# Patient Record
Sex: FEMALE | Race: WHITE | Employment: FULL TIME | URBAN - METROPOLITAN AREA
[De-identification: names, ages, dates, MRNs, and addresses within clinical notes are randomized per-mention and may not be internally consistent; named-entity substitution may affect disease eponyms.]

---

## 2017-01-03 RX ORDER — LISINOPRIL 10 MG/1
TABLET ORAL
Qty: 30 TABLET | Refills: 3 | Status: SHIPPED | OUTPATIENT
Start: 2017-01-03 | End: 2017-05-07 | Stop reason: SDUPTHER

## 2017-01-03 RX ORDER — ATORVASTATIN CALCIUM 20 MG/1
TABLET, FILM COATED ORAL
Qty: 30 TABLET | Refills: 3 | Status: SHIPPED | OUTPATIENT
Start: 2017-01-03 | End: 2017-05-07 | Stop reason: SDUPTHER

## 2017-01-20 DIAGNOSIS — E10.69 TYPE 1 DIABETES MELLITUS WITH HYPEROSMOLAR COMA (HCC): ICD-10-CM

## 2017-01-20 DIAGNOSIS — E10.65 TYPE 1 DIABETES MELLITUS WITH HYPEROSMOLAR COMA (HCC): ICD-10-CM

## 2017-01-20 DIAGNOSIS — E87.0 TYPE 1 DIABETES MELLITUS WITH HYPEROSMOLAR COMA (HCC): ICD-10-CM

## 2017-01-24 ENCOUNTER — OFFICE VISIT (OUTPATIENT)
Dept: SURGERY | Age: 27
End: 2017-01-24

## 2017-01-24 VITALS
HEART RATE: 92 BPM | HEIGHT: 68 IN | DIASTOLIC BLOOD PRESSURE: 58 MMHG | SYSTOLIC BLOOD PRESSURE: 98 MMHG | WEIGHT: 223 LBS | BODY MASS INDEX: 33.8 KG/M2

## 2017-01-24 DIAGNOSIS — E78.00 HYPERCHOLESTEROLEMIA: ICD-10-CM

## 2017-01-24 DIAGNOSIS — E03.9 HYPOTHYROIDISM, UNSPECIFIED TYPE: ICD-10-CM

## 2017-01-24 LAB — GLUCOSE BLD-MCNC: 167 MG/DL

## 2017-01-24 PROCEDURE — 99213 OFFICE O/P EST LOW 20 MIN: CPT | Performed by: INTERNAL MEDICINE

## 2017-01-24 PROCEDURE — 82962 GLUCOSE BLOOD TEST: CPT | Performed by: INTERNAL MEDICINE

## 2017-02-16 RX ORDER — LEVOTHYROXINE SODIUM 0.05 MG/1
TABLET ORAL
Qty: 30 TABLET | Refills: 3 | Status: SHIPPED | OUTPATIENT
Start: 2017-02-16 | End: 2017-06-20 | Stop reason: SDUPTHER

## 2017-03-31 RX ORDER — FUROSEMIDE 40 MG/1
TABLET ORAL
Qty: 30 TABLET | Refills: 3 | Status: SHIPPED | OUTPATIENT
Start: 2017-03-31 | End: 2017-08-02 | Stop reason: SDUPTHER

## 2017-04-17 RX ORDER — POTASSIUM CHLORIDE 750 MG/1
TABLET, EXTENDED RELEASE ORAL
Qty: 30 TABLET | Refills: 3 | Status: SHIPPED | OUTPATIENT
Start: 2017-04-17 | End: 2017-08-23 | Stop reason: SDUPTHER

## 2017-05-08 RX ORDER — ATORVASTATIN CALCIUM 20 MG/1
TABLET, FILM COATED ORAL
Qty: 30 TABLET | Refills: 3 | Status: SHIPPED | OUTPATIENT
Start: 2017-05-08 | End: 2017-09-26 | Stop reason: SDUPTHER

## 2017-05-08 RX ORDER — LISINOPRIL 10 MG/1
TABLET ORAL
Qty: 30 TABLET | Refills: 3 | Status: SHIPPED | OUTPATIENT
Start: 2017-05-08 | End: 2017-09-26 | Stop reason: SDUPTHER

## 2017-06-20 RX ORDER — LEVOTHYROXINE SODIUM 0.05 MG/1
TABLET ORAL
Qty: 30 TABLET | Refills: 3 | Status: SHIPPED | OUTPATIENT
Start: 2017-06-20 | End: 2017-10-31 | Stop reason: SDUPTHER

## 2017-06-28 ENCOUNTER — OFFICE VISIT (OUTPATIENT)
Dept: SURGERY | Age: 27
End: 2017-06-28

## 2017-06-28 VITALS
DIASTOLIC BLOOD PRESSURE: 68 MMHG | WEIGHT: 206 LBS | HEIGHT: 68 IN | BODY MASS INDEX: 31.22 KG/M2 | HEART RATE: 92 BPM | SYSTOLIC BLOOD PRESSURE: 102 MMHG

## 2017-06-28 LAB
GLUCOSE BLD-MCNC: 109 MG/DL
HBA1C MFR BLD: 5.6 %

## 2017-06-28 PROCEDURE — 82962 GLUCOSE BLOOD TEST: CPT | Performed by: INTERNAL MEDICINE

## 2017-06-28 PROCEDURE — 99212 OFFICE O/P EST SF 10 MIN: CPT | Performed by: INTERNAL MEDICINE

## 2017-06-28 PROCEDURE — 83036 HEMOGLOBIN GLYCOSYLATED A1C: CPT | Performed by: INTERNAL MEDICINE

## 2017-08-02 RX ORDER — FUROSEMIDE 40 MG/1
TABLET ORAL
Qty: 30 TABLET | Refills: 3 | Status: SHIPPED | OUTPATIENT
Start: 2017-08-02 | End: 2017-12-26 | Stop reason: SDUPTHER

## 2017-08-23 RX ORDER — POTASSIUM CHLORIDE 750 MG/1
TABLET, EXTENDED RELEASE ORAL
Qty: 30 TABLET | Refills: 3 | Status: SHIPPED | OUTPATIENT
Start: 2017-08-23 | End: 2018-01-30 | Stop reason: SDUPTHER

## 2017-09-27 RX ORDER — ATORVASTATIN CALCIUM 20 MG/1
TABLET, FILM COATED ORAL
Qty: 30 TABLET | Refills: 1 | Status: SHIPPED | OUTPATIENT
Start: 2017-09-27 | End: 2017-12-11 | Stop reason: SDUPTHER

## 2017-09-27 RX ORDER — LISINOPRIL 10 MG/1
TABLET ORAL
Qty: 30 TABLET | Refills: 3 | Status: SHIPPED | OUTPATIENT
Start: 2017-09-27 | End: 2018-02-19 | Stop reason: SDUPTHER

## 2017-12-27 RX ORDER — FUROSEMIDE 40 MG/1
TABLET ORAL
Qty: 30 TABLET | Refills: 0 | Status: SHIPPED | OUTPATIENT
Start: 2017-12-27 | End: 2018-01-30 | Stop reason: SDUPTHER

## 2018-01-31 RX ORDER — POTASSIUM CHLORIDE 750 MG/1
TABLET, EXTENDED RELEASE ORAL
Qty: 30 TABLET | Refills: 3 | Status: SHIPPED | OUTPATIENT
Start: 2018-01-31 | End: 2018-06-06 | Stop reason: SDUPTHER

## 2018-01-31 RX ORDER — FUROSEMIDE 40 MG/1
TABLET ORAL
Qty: 30 TABLET | Refills: 3 | Status: SHIPPED | OUTPATIENT
Start: 2018-01-31 | End: 2018-04-30 | Stop reason: SDUPTHER

## 2018-02-19 RX ORDER — LISINOPRIL 10 MG/1
TABLET ORAL
Qty: 30 TABLET | Refills: 3 | Status: SHIPPED | OUTPATIENT
Start: 2018-02-19 | End: 2018-04-30 | Stop reason: SDUPTHER

## 2018-04-05 ENCOUNTER — OFFICE VISIT (OUTPATIENT)
Dept: ENDOCRINOLOGY | Age: 28
End: 2018-04-05

## 2018-04-05 VITALS
WEIGHT: 185 LBS | HEART RATE: 78 BPM | HEIGHT: 68 IN | SYSTOLIC BLOOD PRESSURE: 104 MMHG | DIASTOLIC BLOOD PRESSURE: 70 MMHG | BODY MASS INDEX: 28.04 KG/M2

## 2018-04-05 DIAGNOSIS — E03.9 HYPOTHYROIDISM, UNSPECIFIED TYPE: ICD-10-CM

## 2018-04-05 LAB
GLUCOSE BLD-MCNC: 82 MG/DL
HBA1C MFR BLD: 5.5 %

## 2018-04-05 PROCEDURE — 83036 HEMOGLOBIN GLYCOSYLATED A1C: CPT | Performed by: INTERNAL MEDICINE

## 2018-04-05 PROCEDURE — 82962 GLUCOSE BLOOD TEST: CPT | Performed by: INTERNAL MEDICINE

## 2018-04-05 PROCEDURE — 99212 OFFICE O/P EST SF 10 MIN: CPT | Performed by: INTERNAL MEDICINE

## 2018-04-06 RX ORDER — LEVOTHYROXINE SODIUM 0.05 MG/1
50 TABLET ORAL DAILY
Qty: 30 TABLET | Refills: 3 | Status: SHIPPED | OUTPATIENT
Start: 2018-04-06 | End: 2018-05-08 | Stop reason: SDUPTHER

## 2018-04-30 RX ORDER — LISINOPRIL 10 MG/1
10 TABLET ORAL DAILY
Qty: 30 TABLET | Refills: 3 | Status: SHIPPED | OUTPATIENT
Start: 2018-04-30 | End: 2018-05-08 | Stop reason: SDUPTHER

## 2018-04-30 RX ORDER — FUROSEMIDE 40 MG/1
40 TABLET ORAL DAILY
Qty: 30 TABLET | Refills: 3 | Status: SHIPPED | OUTPATIENT
Start: 2018-04-30 | End: 2018-07-19 | Stop reason: SDUPTHER

## 2018-04-30 RX ORDER — ATORVASTATIN CALCIUM 20 MG/1
20 TABLET, FILM COATED ORAL DAILY
Qty: 30 TABLET | Refills: 3 | Status: SHIPPED | OUTPATIENT
Start: 2018-04-30 | End: 2019-04-14 | Stop reason: SDUPTHER

## 2018-05-08 RX ORDER — LEVOTHYROXINE SODIUM 0.05 MG/1
50 TABLET ORAL DAILY
Qty: 30 TABLET | Refills: 3 | Status: SHIPPED | OUTPATIENT
Start: 2018-05-08 | End: 2019-08-07 | Stop reason: SDUPTHER

## 2018-05-08 RX ORDER — LISINOPRIL 10 MG/1
10 TABLET ORAL DAILY
Qty: 30 TABLET | Refills: 3 | Status: SHIPPED | OUTPATIENT
Start: 2018-05-08 | End: 2019-08-07 | Stop reason: SDUPTHER

## 2018-06-06 RX ORDER — POTASSIUM CHLORIDE 750 MG/1
TABLET, EXTENDED RELEASE ORAL
Qty: 30 TABLET | Refills: 3 | Status: SHIPPED | OUTPATIENT
Start: 2018-06-06 | End: 2018-10-15 | Stop reason: SDUPTHER

## 2018-07-09 DIAGNOSIS — E03.9 HYPOTHYROIDISM, UNSPECIFIED TYPE: ICD-10-CM

## 2018-07-09 LAB
ANION GAP SERPL CALCULATED.3IONS-SCNC: 7 MEQ/L (ref 7–13)
BUN BLDV-MCNC: 43 MG/DL (ref 6–20)
CALCIUM SERPL-MCNC: 8 MG/DL (ref 8.6–10.2)
CHLORIDE BLD-SCNC: 107 MEQ/L (ref 98–107)
CO2: 24 MEQ/L (ref 22–29)
CREAT SERPL-MCNC: 0.68 MG/DL (ref 0.5–0.9)
CREATININE URINE: 13.6 MG/DL
GFR AFRICAN AMERICAN: >60
GFR NON-AFRICAN AMERICAN: >60
GLUCOSE BLD-MCNC: 96 MG/DL (ref 74–109)
HBA1C MFR BLD: 5.7 % (ref 4.8–5.9)
MICROALBUMIN UR-MCNC: 14.2 MG/DL
MICROALBUMIN/CREAT UR-RTO: 1044.1 MG/G (ref 0–30)
POTASSIUM SERPL-SCNC: 4.1 MEQ/L (ref 3.5–5.1)
SODIUM BLD-SCNC: 138 MEQ/L (ref 132–144)
T4 FREE: 1.32 NG/DL (ref 0.93–1.7)
TSH SERPL DL<=0.05 MIU/L-ACNC: 1.41 UIU/ML (ref 0.27–4.2)

## 2018-07-12 ENCOUNTER — OFFICE VISIT (OUTPATIENT)
Dept: ENDOCRINOLOGY | Age: 28
End: 2018-07-12
Payer: COMMERCIAL

## 2018-07-12 VITALS
DIASTOLIC BLOOD PRESSURE: 64 MMHG | WEIGHT: 188 LBS | HEART RATE: 80 BPM | HEIGHT: 68 IN | BODY MASS INDEX: 28.49 KG/M2 | SYSTOLIC BLOOD PRESSURE: 102 MMHG

## 2018-07-12 DIAGNOSIS — R60.0 LOCALIZED EDEMA: ICD-10-CM

## 2018-07-12 DIAGNOSIS — E10.21 DIABETIC NEPHROPATHY ASSOCIATED WITH TYPE 1 DIABETES MELLITUS (HCC): ICD-10-CM

## 2018-07-12 DIAGNOSIS — E03.9 HYPOTHYROIDISM, UNSPECIFIED TYPE: ICD-10-CM

## 2018-07-12 LAB — GLUCOSE BLD-MCNC: 112 MG/DL

## 2018-07-12 PROCEDURE — 99214 OFFICE O/P EST MOD 30 MIN: CPT | Performed by: INTERNAL MEDICINE

## 2018-07-12 PROCEDURE — 82962 GLUCOSE BLOOD TEST: CPT | Performed by: INTERNAL MEDICINE

## 2018-07-12 RX ORDER — SPIRONOLACTONE 50 MG/1
50 TABLET, FILM COATED ORAL DAILY
Qty: 30 TABLET | Refills: 3 | Status: SHIPPED | OUTPATIENT
Start: 2018-07-12 | End: 2019-03-07 | Stop reason: SDUPTHER

## 2018-07-16 ASSESSMENT — ENCOUNTER SYMPTOMS: SHORTNESS OF BREATH: 0

## 2018-07-17 NOTE — PROGRESS NOTES
Patient Fasting?/# of Hours     Answer:   Yes, 12 Hours    T4, Free     Standing Status:   Future     Standing Expiration Date:   7/12/2019    TSH without Reflex     Standing Status:   Future     Standing Expiration Date:   7/12/2019    POCT Glucose    HM DIABETES FOOT EXAM     Add aldactone for 1-2 weeks   Orders Placed This Encounter   Medications    spironolactone (ALDACTONE) 50 MG tablet     Sig: Take 1 tablet by mouth daily     Dispense:  30 tablet     Refill:  3     Diabetes education provided today:    Diabetic nephropathy: Kidney function, microalbumin as a sign of diabetic nephropathy. Stages of kidney disease. Managing high and low sugar readings.       continue lasix   continue current medication regimen for diabetes hypothyroidism   More than 50 % of 25 min spend in pt education/counsleing review of labs

## 2018-07-19 RX ORDER — FUROSEMIDE 40 MG/1
40 TABLET ORAL 2 TIMES DAILY
Qty: 60 TABLET | Refills: 3 | Status: SHIPPED | OUTPATIENT
Start: 2018-07-19 | End: 2019-01-06 | Stop reason: SDUPTHER

## 2018-10-15 RX ORDER — POTASSIUM CHLORIDE 750 MG/1
TABLET, EXTENDED RELEASE ORAL
Qty: 30 TABLET | Refills: 2 | Status: SHIPPED | OUTPATIENT
Start: 2018-10-15 | End: 2020-09-18 | Stop reason: SDUPTHER

## 2019-01-07 RX ORDER — FUROSEMIDE 40 MG/1
TABLET ORAL
Qty: 30 TABLET | Refills: 3 | Status: SHIPPED | OUTPATIENT
Start: 2019-01-07 | End: 2019-04-14 | Stop reason: SDUPTHER

## 2019-01-07 RX ORDER — POTASSIUM CHLORIDE 750 MG/1
TABLET, FILM COATED, EXTENDED RELEASE ORAL
Qty: 30 TABLET | Refills: 3 | Status: SHIPPED | OUTPATIENT
Start: 2019-01-07 | End: 2019-04-29 | Stop reason: SDUPTHER

## 2019-03-08 RX ORDER — SPIRONOLACTONE 50 MG/1
TABLET, FILM COATED ORAL
Qty: 90 TABLET | Refills: 0 | Status: SHIPPED | OUTPATIENT
Start: 2019-03-08 | End: 2019-06-06 | Stop reason: SDUPTHER

## 2019-04-15 RX ORDER — FUROSEMIDE 40 MG/1
40 TABLET ORAL DAILY
Qty: 30 TABLET | Refills: 3 | Status: SHIPPED | OUTPATIENT
Start: 2019-04-15 | End: 2019-06-12 | Stop reason: SDUPTHER

## 2019-04-15 RX ORDER — ATORVASTATIN CALCIUM 20 MG/1
20 TABLET, FILM COATED ORAL DAILY
Qty: 30 TABLET | Refills: 3 | Status: SHIPPED | OUTPATIENT
Start: 2019-04-15 | End: 2019-07-11 | Stop reason: SDUPTHER

## 2019-04-15 RX ORDER — FUROSEMIDE 40 MG/1
TABLET ORAL
Qty: 30 TABLET | Refills: 3 | Status: SHIPPED | OUTPATIENT
Start: 2019-04-15 | End: 2019-06-10 | Stop reason: SDUPTHER

## 2019-04-30 RX ORDER — POTASSIUM CHLORIDE 750 MG/1
TABLET, FILM COATED, EXTENDED RELEASE ORAL
Qty: 30 TABLET | Refills: 3 | Status: SHIPPED | OUTPATIENT
Start: 2019-04-30 | End: 2019-06-20 | Stop reason: SDUPTHER

## 2019-06-10 DIAGNOSIS — E11.21 DIABETIC NEPHROPATHY (HCC): ICD-10-CM

## 2019-06-10 DIAGNOSIS — E03.9 HYPOTHYROID: ICD-10-CM

## 2019-06-10 RX ORDER — SPIRONOLACTONE 50 MG/1
TABLET, FILM COATED ORAL
Qty: 90 TABLET | Refills: 0 | Status: SHIPPED | OUTPATIENT
Start: 2019-06-10 | End: 2019-09-03 | Stop reason: SDUPTHER

## 2019-06-11 RX ORDER — FUROSEMIDE 40 MG/1
40 TABLET ORAL DAILY
Qty: 30 TABLET | Refills: 0 | Status: SHIPPED | OUTPATIENT
Start: 2019-06-11 | End: 2019-07-19 | Stop reason: SDUPTHER

## 2019-06-13 RX ORDER — FUROSEMIDE 40 MG/1
40 TABLET ORAL DAILY
Qty: 30 TABLET | Refills: 0 | Status: SHIPPED | OUTPATIENT
Start: 2019-06-13 | End: 2019-06-20 | Stop reason: SDUPTHER

## 2019-06-20 ENCOUNTER — OFFICE VISIT (OUTPATIENT)
Dept: ENDOCRINOLOGY | Age: 29
End: 2019-06-20
Payer: COMMERCIAL

## 2019-06-20 VITALS
DIASTOLIC BLOOD PRESSURE: 60 MMHG | HEART RATE: 89 BPM | BODY MASS INDEX: 29.35 KG/M2 | WEIGHT: 190.2 LBS | SYSTOLIC BLOOD PRESSURE: 94 MMHG

## 2019-06-20 DIAGNOSIS — E10.21 DIABETIC NEPHROPATHY ASSOCIATED WITH TYPE 1 DIABETES MELLITUS (HCC): ICD-10-CM

## 2019-06-20 DIAGNOSIS — E03.9 HYPOTHYROID: ICD-10-CM

## 2019-06-20 DIAGNOSIS — E03.9 HYPOTHYROIDISM, UNSPECIFIED TYPE: ICD-10-CM

## 2019-06-20 DIAGNOSIS — M54.12 CERVICAL RADICULOPATHY: ICD-10-CM

## 2019-06-20 DIAGNOSIS — M54.16 LUMBAR RADICULOPATHY: ICD-10-CM

## 2019-06-20 LAB
ANION GAP SERPL CALCULATED.3IONS-SCNC: 15 MEQ/L (ref 9–15)
BUN BLDV-MCNC: 20 MG/DL (ref 6–20)
CALCIUM SERPL-MCNC: 9.3 MG/DL (ref 8.5–9.9)
CHLORIDE BLD-SCNC: 102 MEQ/L (ref 95–107)
CHP ED QC CHECK: NORMAL
CO2: 22 MEQ/L (ref 20–31)
CREAT SERPL-MCNC: 0.75 MG/DL (ref 0.5–0.9)
CREATININE URINE: 193.3 MG/DL
GFR AFRICAN AMERICAN: >60
GFR NON-AFRICAN AMERICAN: >60
GLUCOSE BLD-MCNC: 116 MG/DL (ref 70–99)
GLUCOSE BLD-MCNC: 131 MG/DL
HBA1C MFR BLD: 5.9 %
MICROALBUMIN UR-MCNC: <1.2 MG/DL
MICROALBUMIN/CREAT UR-RTO: NORMAL MG/G (ref 0–30)
POTASSIUM SERPL-SCNC: 4.7 MEQ/L (ref 3.4–4.9)
SODIUM BLD-SCNC: 139 MEQ/L (ref 135–144)
T4 FREE: 1.39 NG/DL (ref 0.84–1.68)
TSH SERPL DL<=0.05 MIU/L-ACNC: 3.08 UIU/ML (ref 0.44–3.86)

## 2019-06-20 PROCEDURE — 82962 GLUCOSE BLOOD TEST: CPT | Performed by: INTERNAL MEDICINE

## 2019-06-20 PROCEDURE — 83036 HEMOGLOBIN GLYCOSYLATED A1C: CPT | Performed by: INTERNAL MEDICINE

## 2019-06-20 PROCEDURE — 99214 OFFICE O/P EST MOD 30 MIN: CPT | Performed by: INTERNAL MEDICINE

## 2019-06-20 RX ORDER — POTASSIUM CHLORIDE 750 MG/1
TABLET, FILM COATED, EXTENDED RELEASE ORAL
Qty: 30 TABLET | Refills: 3 | Status: SHIPPED | OUTPATIENT
Start: 2019-06-20 | End: 2021-06-10

## 2019-06-20 RX ORDER — FUROSEMIDE 40 MG/1
40 TABLET ORAL DAILY
Qty: 30 TABLET | Refills: 3 | Status: SHIPPED | OUTPATIENT
Start: 2019-06-20 | End: 2020-09-18 | Stop reason: SDUPTHER

## 2019-06-24 ASSESSMENT — ENCOUNTER SYMPTOMS: BACK PAIN: 1

## 2019-06-24 NOTE — PROGRESS NOTES
file   Lifestyle    Physical activity:     Days per week: Not on file     Minutes per session: Not on file    Stress: Not on file   Relationships    Social connections:     Talks on phone: Not on file     Gets together: Not on file     Attends Buddhist service: Not on file     Active member of club or organization: Not on file     Attends meetings of clubs or organizations: Not on file     Relationship status: Not on file    Intimate partner violence:     Fear of current or ex partner: Not on file     Emotionally abused: Not on file     Physically abused: Not on file     Forced sexual activity: Not on file   Other Topics Concern    Not on file   Social History Narrative    Not on file     No past surgical history on file.     Allergies   Allergen Reactions    Morphine     Pcn [Penicillins]        Current Outpatient Medications:     furosemide (LASIX) 40 MG tablet, Take 1 tablet by mouth daily, Disp: 30 tablet, Rfl: 3    potassium chloride (KLOR-CON 10) 10 MEQ extended release tablet, TAKE 1 TABLET BY MOUTH EVERY DAY, Disp: 30 tablet, Rfl: 3    insulin lispro (HUMALOG KWIKPEN) 100 UNIT/ML pen, 10 units at each meals, Disp: 30 mL, Rfl: 0    insulin regular (NOVOLIN R) 100 UNIT/ML injection, 10 units with each meals, Disp: 1 vial, Rfl: 3    blood glucose test strips (FREESTYLE LITE) strip, 1 each by In Vitro route 4 times daily, Disp: 150 each, Rfl: 0    furosemide (LASIX) 40 MG tablet, Take 1 tablet by mouth daily, Disp: 30 tablet, Rfl: 0    spironolactone (ALDACTONE) 50 MG tablet, TAKE 1 TABLET BY MOUTH EVERY DAY, Disp: 90 tablet, Rfl: 0    atorvastatin (LIPITOR) 20 MG tablet, Take 1 tablet by mouth daily, Disp: 30 tablet, Rfl: 3    potassium chloride (KLOR-CON M) 10 MEQ extended release tablet, TAKE 1 TABLET BY MOUTH ONCE DAILY, Disp: 30 tablet, Rfl: 2    levothyroxine (SYNTHROID) 50 MCG tablet, Take 1 tablet by mouth daily, Disp: 30 tablet, Rfl: 3    lisinopril (PRINIVIL;ZESTRIL) 10 MG tablet, Take 1 tablet by mouth daily, Disp: 30 tablet, Rfl: 3    LANTUS SOLOSTAR 100 UNIT/ML injection pen, INJECT 60 UNITS SUBCUTANEOUSLY DAILY AT BEDTIME, Disp: 15 mL, Rfl: 3    [facility] UNIFINE PENTIPS 32G X 4 MM MISC, use FOUR TIMES DAILY, Disp: 300 each, Rfl: 3    Blood Glucose Monitoring Suppl (FREESTYLE LITE) RAMYA, Please give 1 meter dx 250.01, Disp: 1 Device, Rfl: 0    FREESTYLE LANCETS MISC, Pt test 4x daily dx 250.01, Disp: 150 each, Rfl: 6    Alcohol Swabs (ALCOHOL WIPES) PADS, As directed, Disp: 100 each, Rfl: 06    Insulin Pen Needle (NOVOFINE) 32G X 6 MM MISC, Qid, Disp: 300 each, Rfl: 3    Review of Systems   Cardiovascular: Positive for leg swelling. Endocrine: Negative for polydipsia and polyuria. Musculoskeletal: Positive for back pain. All other systems reviewed and are negative. Vitals:    06/20/19 1610   BP: 94/60   Site: Left Upper Arm   Position: Sitting   Cuff Size: Small Adult   Pulse: 89   Weight: 190 lb 3.2 oz (86.3 kg)       Objective:   Physical Exam   Constitutional: She appears well-developed and well-nourished. HENT:   Head: Normocephalic and atraumatic. Right Ear: External ear normal.   Left Ear: External ear normal.   Eyes: Pupils are equal, round, and reactive to light. Right eye exhibits no discharge. Left eye exhibits no discharge. No scleral icterus. Neck: Neck supple. No thyromegaly present. Cardiovascular: Normal rate, regular rhythm and normal heart sounds. Pulmonary/Chest: Breath sounds normal.   Musculoskeletal: Normal range of motion. She exhibits edema. Lymphadenopathy:     She has no cervical adenopathy. Neurological: She is alert. Skin: Skin is warm. Psychiatric: She has a normal mood and affect. Results for Carrie Webber (MRN [id number]) as of 6/24/2019 07:55   Ref.  Range 6/20/2019 17:15   Sodium Latest Ref Range: 135 - 144 mEq/L 139   Potassium Latest Ref Range: 3.4 - 4.9 mEq/L 4.7   Chloride Latest Ref Range: 95 - 107 mEq/L 102 CO2 Latest Ref Range: 20 - 31 mEq/L 22   BUN Latest Ref Range: 6 - 20 mg/dL 20   Creatinine Latest Ref Range: 0.50 - 0.90 mg/dL 0.75   Anion Gap Latest Ref Range: 9 - 15 mEq/L 15   GFR Non- Latest Ref Range: >60  >60.0   GFR African American Latest Ref Range: >60  >60.0   Glucose Latest Ref Range: 70 - 99 mg/dL 116 (H)   Calcium Latest Ref Range: 8.5 - 9.9 mg/dL 9.3   TSH Latest Ref Range: 0.440 - 3.860 uIU/mL 3.080   T4 Free Latest Ref Range: 0.84 - 1.68 ng/dL 1.39   Creatinine, Ur Latest Ref Range: Not Established mg/dL 193.3   Microalbumin Creatinine Ratio Latest Ref Range: 0.0 - 30.0 mg/G see below   Microalbumin, Random Urine Latest Ref Range: Not Established mg/dL <1.20     Assessment:       Diagnosis Orders   1. Uncontrolled type 1 diabetes mellitus without complication (HCC)  POCT Glucose    POCT glycosylated hemoglobin (Hb A1C)    Basic Metabolic Panel   2. Diabetic nephropathy associated with type 1 diabetes mellitus (HCC)  POCT Glucose    POCT glycosylated hemoglobin (Hb A1C)   3. Hypothyroid  insulin lispro (HUMALOG KWIKPEN) 100 UNIT/ML pen    T4, Free    TSH without Reflex   4. Diabetic nephropathy (HCC)  insulin lispro (HUMALOG KWIKPEN) 100 UNIT/ML pen   5. Cervical radiculopathy  ALISHA Harrison MD, Neurology, [place]   6.  Lumbar radiculopathy  ALISHA Harrison MD, Neurology, [place]           Plan:      Orders Placed This Encounter   Procedures    Basic Metabolic Panel     Standing Status:   Future     Number of Occurrences:   1     Standing Expiration Date:   6/20/2020    T4, Free     Standing Status:   Future     Number of Occurrences:   1     Standing Expiration Date:   6/20/2020    TSH without Reflex     Standing Status:   Future     Number of Occurrences:   1     Standing Expiration Date:   6/20/2020   Hair Heart MD, Neurology, [place]     Referral Priority:   Routine     Referral Type:   Eval and Treat     Referral Reason:   Specialty Services Required

## 2019-07-11 RX ORDER — ATORVASTATIN CALCIUM 20 MG/1
TABLET, FILM COATED ORAL
Qty: 90 TABLET | Refills: 1 | Status: SHIPPED | OUTPATIENT
Start: 2019-07-11 | End: 2020-01-20

## 2019-07-22 RX ORDER — FUROSEMIDE 40 MG/1
40 TABLET ORAL DAILY
Qty: 30 TABLET | Refills: 3 | Status: SHIPPED | OUTPATIENT
Start: 2019-07-22 | End: 2019-12-01 | Stop reason: SDUPTHER

## 2019-08-07 RX ORDER — LISINOPRIL 10 MG/1
10 TABLET ORAL DAILY
Qty: 30 TABLET | Refills: 3 | Status: SHIPPED | OUTPATIENT
Start: 2019-08-07 | End: 2020-02-03 | Stop reason: SDUPTHER

## 2019-08-07 RX ORDER — LEVOTHYROXINE SODIUM 0.05 MG/1
50 TABLET ORAL DAILY
Qty: 30 TABLET | Refills: 3 | Status: SHIPPED | OUTPATIENT
Start: 2019-08-07 | End: 2019-09-09 | Stop reason: SDUPTHER

## 2019-08-14 RX ORDER — POTASSIUM CHLORIDE 750 MG/1
TABLET, FILM COATED, EXTENDED RELEASE ORAL
Qty: 90 TABLET | Refills: 1 | Status: SHIPPED | OUTPATIENT
Start: 2019-08-14 | End: 2019-09-02 | Stop reason: SDUPTHER

## 2019-09-03 RX ORDER — POTASSIUM CHLORIDE 750 MG/1
TABLET, FILM COATED, EXTENDED RELEASE ORAL
Qty: 90 TABLET | Refills: 1 | Status: SHIPPED | OUTPATIENT
Start: 2019-09-03 | End: 2020-02-26

## 2019-09-04 RX ORDER — SPIRONOLACTONE 50 MG/1
50 TABLET, FILM COATED ORAL DAILY
Qty: 91 TABLET | Refills: 0 | Status: SHIPPED | OUTPATIENT
Start: 2019-09-04 | End: 2019-11-15 | Stop reason: SDUPTHER

## 2019-09-09 RX ORDER — LEVOTHYROXINE SODIUM 0.05 MG/1
50 TABLET ORAL DAILY
Qty: 30 TABLET | Refills: 3 | Status: SHIPPED | OUTPATIENT
Start: 2019-09-09 | End: 2019-10-03 | Stop reason: SDUPTHER

## 2019-10-07 RX ORDER — LEVOTHYROXINE SODIUM 0.05 MG/1
50 TABLET ORAL DAILY
Qty: 30 TABLET | Refills: 3 | Status: SHIPPED | OUTPATIENT
Start: 2019-10-07 | End: 2020-02-03 | Stop reason: SDUPTHER

## 2019-11-15 RX ORDER — SPIRONOLACTONE 50 MG/1
50 TABLET, FILM COATED ORAL DAILY
Qty: 91 TABLET | Refills: 0 | Status: SHIPPED | OUTPATIENT
Start: 2019-11-15 | End: 2020-02-03 | Stop reason: SDUPTHER

## 2019-12-02 RX ORDER — FUROSEMIDE 40 MG/1
40 TABLET ORAL DAILY
Qty: 30 TABLET | Refills: 3 | Status: SHIPPED | OUTPATIENT
Start: 2019-12-02 | End: 2020-03-26

## 2019-12-04 RX ORDER — FUROSEMIDE 40 MG/1
TABLET ORAL
Qty: 90 TABLET | Refills: 1 | Status: SHIPPED | OUTPATIENT
Start: 2019-12-04 | End: 2020-09-18 | Stop reason: SDUPTHER

## 2020-01-20 RX ORDER — ATORVASTATIN CALCIUM 20 MG/1
TABLET, FILM COATED ORAL
Qty: 90 TABLET | Refills: 1 | Status: SHIPPED | OUTPATIENT
Start: 2020-01-20 | End: 2020-02-03 | Stop reason: SDUPTHER

## 2020-02-04 RX ORDER — LEVOTHYROXINE SODIUM 0.05 MG/1
50 TABLET ORAL DAILY
Qty: 30 TABLET | Refills: 3 | Status: SHIPPED | OUTPATIENT
Start: 2020-02-04 | End: 2020-09-18 | Stop reason: SDUPTHER

## 2020-02-04 RX ORDER — ATORVASTATIN CALCIUM 20 MG/1
TABLET, FILM COATED ORAL
Qty: 30 TABLET | Refills: 3 | Status: SHIPPED | OUTPATIENT
Start: 2020-02-04 | End: 2020-09-18 | Stop reason: SDUPTHER

## 2020-02-04 RX ORDER — SPIRONOLACTONE 50 MG/1
TABLET, FILM COATED ORAL
Qty: 30 TABLET | Refills: 3 | Status: SHIPPED | OUTPATIENT
Start: 2020-02-04 | End: 2020-09-25 | Stop reason: SDUPTHER

## 2020-02-04 RX ORDER — LISINOPRIL 10 MG/1
10 TABLET ORAL DAILY
Qty: 30 TABLET | Refills: 3 | Status: SHIPPED | OUTPATIENT
Start: 2020-02-04 | End: 2020-09-18 | Stop reason: SDUPTHER

## 2020-02-05 RX ORDER — ATORVASTATIN CALCIUM 20 MG/1
20 TABLET, FILM COATED ORAL DAILY
Qty: 90 TABLET | Refills: 1 | Status: SHIPPED | OUTPATIENT
Start: 2020-02-05 | End: 2020-06-16 | Stop reason: SDUPTHER

## 2020-02-05 RX ORDER — LEVOTHYROXINE SODIUM 0.05 MG/1
50 TABLET ORAL DAILY
Qty: 30 TABLET | Refills: 3 | Status: SHIPPED | OUTPATIENT
Start: 2020-02-05 | End: 2021-03-08

## 2020-02-05 RX ORDER — LISINOPRIL 10 MG/1
10 TABLET ORAL DAILY
Qty: 30 TABLET | Refills: 3 | Status: SHIPPED | OUTPATIENT
Start: 2020-02-05 | End: 2021-09-24

## 2020-02-05 RX ORDER — SPIRONOLACTONE 50 MG/1
50 TABLET, FILM COATED ORAL DAILY
Qty: 91 TABLET | Refills: 0 | Status: SHIPPED | OUTPATIENT
Start: 2020-02-05 | End: 2020-06-16 | Stop reason: SDUPTHER

## 2020-02-26 RX ORDER — POTASSIUM CHLORIDE 750 MG/1
TABLET, FILM COATED, EXTENDED RELEASE ORAL
Qty: 90 TABLET | Refills: 0 | Status: SHIPPED | OUTPATIENT
Start: 2020-02-26 | End: 2020-03-11 | Stop reason: SDUPTHER

## 2020-03-11 RX ORDER — POTASSIUM CHLORIDE 750 MG/1
TABLET, FILM COATED, EXTENDED RELEASE ORAL
Qty: 90 TABLET | Refills: 0 | Status: SHIPPED | OUTPATIENT
Start: 2020-03-11 | End: 2020-09-16 | Stop reason: SDUPTHER

## 2020-03-26 RX ORDER — FUROSEMIDE 40 MG/1
TABLET ORAL
Qty: 90 TABLET | Refills: 1 | Status: SHIPPED | OUTPATIENT
Start: 2020-03-26 | End: 2020-09-16 | Stop reason: SDUPTHER

## 2020-06-17 RX ORDER — ATORVASTATIN CALCIUM 20 MG/1
20 TABLET, FILM COATED ORAL DAILY
Qty: 90 TABLET | Refills: 1 | Status: SHIPPED | OUTPATIENT
Start: 2020-06-17 | End: 2020-09-16 | Stop reason: SDUPTHER

## 2020-06-17 RX ORDER — SPIRONOLACTONE 50 MG/1
50 TABLET, FILM COATED ORAL DAILY
Qty: 91 TABLET | Refills: 0 | Status: SHIPPED | OUTPATIENT
Start: 2020-06-17 | End: 2020-09-16 | Stop reason: SDUPTHER

## 2020-08-03 ENCOUNTER — NURSE TRIAGE (OUTPATIENT)
Dept: OTHER | Facility: CLINIC | Age: 30
End: 2020-08-03

## 2020-08-03 NOTE — TELEPHONE ENCOUNTER
Reason for Disposition   [1] Symptoms of COVID-19 (e.g., cough, fever, SOB, or others) AND [2] within 14 days of EXPOSURE (close contact) with diagnosed or suspected COVID-19 patient   HIGH RISK patient (e.g., age > 59 years, diabetes, heart or lung disease, weak immune system)    Answer Assessment - Initial Assessment Questions  1. CLOSE CONTACT: \"Who is the person with the confirmed or suspected COVID-19 infection that you were exposed to? \"      Co worker   2. PLACE of CONTACT: \"Where were you when you were exposed to COVID-19? \" (e.g., home, school, medical waiting room; which city?)      Factory   3. TYPE of CONTACT: \"How much contact was there? \" (e.g., sitting next to, live in same house, work in same office, same building)      Working on the same and switching areas near person who was positive  4. DURATION of CONTACT: \"How long were you in contact with the COVID-19 patient? \" (e.g., a few seconds, passed by person, a few minutes, live with the patient)      Three days last week Monday Tuesday Wednesday   5. DATE of CONTACT: \"When did you have contact with a COVID-19 patient? \" (e.g., how many days ago)      Five days ago  6. TRAVEL: \"Have you traveled out of the country recently? \" If so, \"When and where? \"      * Also ask about out-of-state travel, since the [facility] has identified some high-risk cities for community spread in the [address] [place] [address]. * Note: Travel becomes less relevant if there is widespread community transmission where the patient lives. no  7. COMMUNITY SPREAD: \"Are there lots of cases of COVID-19 (community spread) where you live? \" (See public health department website, if unsure)        Yes   8. SYMPTOMS: \"Do you have any symptoms? \" (e.g., fever, cough, breathing difficulty)      Chills, body aches, headache, diarrhea   9. PREGNANCY OR POSTPARTUM: \"Is there any chance you are pregnant? \" \"When was your last menstrual period? \" \"Did you deliver in the last 2 weeks? \"      No   10.  HIGH RISK: \"Do you have any heart or lung problems? Do you have a weak immune system? \" (e.g., CHF, COPD, asthma, HIV positive, chemotherapy, renal failure, diabetes mellitus, sickle cell anemia)        Type I diabetes, ckd    Answer Assessment - Initial Assessment Questions  1. COVID-19 DIAGNOSIS: \"Who made your Coronavirus (COVID-19) diagnosis? \" \"Was it confirmed by a positive lab test?\" If not diagnosed by a HCP, ask \"Are there lots of cases (community spread) where you live? \" (See [facility] health department website, if unsure)      Not tested  2. ONSET: \"When did the COVID-19 symptoms start? \"       Two weeks but worsened this past weekend  3. WORST SYMPTOM: \"What is your worst symptom? \" (e.g., cough, fever, shortness of breath, muscle aches)      Headache, bodyache  4. COUGH: \"Do you have a cough? \" If so, ask: \"How bad is the cough? \"        no  5. FEVER: \"Do you have a fever? \" If so, ask: \"What is your temperature, how was it measured, and when did it start? \"      no  6. RESPIRATORY STATUS: \"Describe your breathing? \" (e.g., shortness of breath, wheezing, unable to speak)       normal  7. BETTER-SAME-WORSE: Merna Peer you getting better, staying the same or getting worse compared to yesterday? \"  If getting worse, ask, \"In what way? \"      same  8. HIGH RISK DISEASE: \"Do you have any chronic medical problems? \" (e.g., asthma, heart or lung disease, weak immune system, etc.)      Diabetes type 1   9. PREGNANCY: \"Is there any chance you are pregnant? \" \"When was your last menstrual period? \"      no  10. OTHER SYMPTOMS: \"Do you have any other symptoms? \"  (e.g., chills, fatigue, headache, loss of smell or taste, muscle pain, sore throat)        Fatigue, muscle pain, diarrhea    Protocols used: CORONAVIRUS (COVID-19) EXPOSURE-ADULT-AH, CORONAVIRUS (COVID-19) DIAGNOSED OR SUSPECTED-ADULT-AH

## 2020-09-11 RX ORDER — FUROSEMIDE 40 MG/1
TABLET ORAL
Qty: 90 TABLET | Refills: 1 | OUTPATIENT
Start: 2020-09-11

## 2020-09-11 RX ORDER — POTASSIUM CHLORIDE 750 MG/1
TABLET, FILM COATED, EXTENDED RELEASE ORAL
Qty: 90 TABLET | Refills: 0 | OUTPATIENT
Start: 2020-09-11

## 2020-09-14 RX ORDER — SPIRONOLACTONE 50 MG/1
50 TABLET, FILM COATED ORAL DAILY
Qty: 91 TABLET | Refills: 0 | OUTPATIENT
Start: 2020-09-14 | End: 2020-12-14

## 2020-09-14 RX ORDER — SPIRONOLACTONE 50 MG/1
TABLET, FILM COATED ORAL
Qty: 90 TABLET | Refills: 1 | OUTPATIENT
Start: 2020-09-14

## 2020-09-14 RX ORDER — POTASSIUM CHLORIDE 750 MG/1
TABLET, FILM COATED, EXTENDED RELEASE ORAL
Qty: 90 TABLET | Refills: 0 | OUTPATIENT
Start: 2020-09-14

## 2020-09-14 RX ORDER — FUROSEMIDE 40 MG/1
TABLET ORAL
Qty: 90 TABLET | Refills: 1 | OUTPATIENT
Start: 2020-09-14

## 2020-09-14 RX ORDER — ATORVASTATIN CALCIUM 20 MG/1
20 TABLET, FILM COATED ORAL DAILY
Qty: 90 TABLET | Refills: 1 | OUTPATIENT
Start: 2020-09-14

## 2020-09-16 RX ORDER — FUROSEMIDE 40 MG/1
TABLET ORAL
Qty: 7 TABLET | Refills: 0 | Status: SHIPPED | OUTPATIENT
Start: 2020-09-16 | End: 2020-09-18 | Stop reason: SDUPTHER

## 2020-09-16 RX ORDER — POTASSIUM CHLORIDE 750 MG/1
TABLET, FILM COATED, EXTENDED RELEASE ORAL
Qty: 7 TABLET | Refills: 0 | Status: SHIPPED | OUTPATIENT
Start: 2020-09-16 | End: 2020-09-18 | Stop reason: SDUPTHER

## 2020-09-16 RX ORDER — SPIRONOLACTONE 50 MG/1
TABLET, FILM COATED ORAL
Qty: 7 TABLET | Refills: 0 | Status: SHIPPED | OUTPATIENT
Start: 2020-09-16 | End: 2020-09-18 | Stop reason: SDUPTHER

## 2020-09-16 RX ORDER — ATORVASTATIN CALCIUM 20 MG/1
20 TABLET, FILM COATED ORAL DAILY
Qty: 7 TABLET | Refills: 0 | Status: SHIPPED | OUTPATIENT
Start: 2020-09-16 | End: 2020-09-18 | Stop reason: SDUPTHER

## 2020-09-18 ENCOUNTER — OFFICE VISIT (OUTPATIENT)
Dept: ENDOCRINOLOGY | Age: 30
End: 2020-09-18
Payer: COMMERCIAL

## 2020-09-18 VITALS
DIASTOLIC BLOOD PRESSURE: 69 MMHG | HEART RATE: 85 BPM | OXYGEN SATURATION: 98 % | WEIGHT: 202 LBS | SYSTOLIC BLOOD PRESSURE: 115 MMHG | BODY MASS INDEX: 31.17 KG/M2

## 2020-09-18 DIAGNOSIS — E03.9 HYPOTHYROIDISM, UNSPECIFIED TYPE: ICD-10-CM

## 2020-09-18 LAB
ANION GAP SERPL CALCULATED.3IONS-SCNC: 10 MEQ/L (ref 9–15)
BUN BLDV-MCNC: 21 MG/DL (ref 6–20)
CALCIUM SERPL-MCNC: 9.4 MG/DL (ref 8.5–9.9)
CHLORIDE BLD-SCNC: 105 MEQ/L (ref 95–107)
CHOLESTEROL, TOTAL: 106 MG/DL (ref 0–199)
CHP ED QC CHECK: NORMAL
CO2: 23 MEQ/L (ref 20–31)
CREAT SERPL-MCNC: 0.58 MG/DL (ref 0.5–0.9)
CREATININE URINE: 140.9 MG/DL
GFR AFRICAN AMERICAN: >60
GFR NON-AFRICAN AMERICAN: >60
GLUCOSE BLD-MCNC: 121 MG/DL (ref 70–99)
GLUCOSE BLD-MCNC: 166 MG/DL
HBA1C MFR BLD: 6.5 %
HDLC SERPL-MCNC: 62 MG/DL (ref 40–59)
LDL CHOLESTEROL CALCULATED: 28 MG/DL (ref 0–129)
MICROALBUMIN UR-MCNC: <1.2 MG/DL
MICROALBUMIN/CREAT UR-RTO: NORMAL MG/G (ref 0–30)
POTASSIUM SERPL-SCNC: 4 MEQ/L (ref 3.4–4.9)
SODIUM BLD-SCNC: 138 MEQ/L (ref 135–144)
T4 FREE: 1.3 NG/DL (ref 0.84–1.68)
TRIGL SERPL-MCNC: 80 MG/DL (ref 0–150)
TSH SERPL DL<=0.05 MIU/L-ACNC: 0.96 UIU/ML (ref 0.44–3.86)

## 2020-09-18 PROCEDURE — 83036 HEMOGLOBIN GLYCOSYLATED A1C: CPT | Performed by: INTERNAL MEDICINE

## 2020-09-18 PROCEDURE — 99214 OFFICE O/P EST MOD 30 MIN: CPT | Performed by: INTERNAL MEDICINE

## 2020-09-18 PROCEDURE — 82962 GLUCOSE BLOOD TEST: CPT | Performed by: INTERNAL MEDICINE

## 2020-09-18 RX ORDER — FUROSEMIDE 40 MG/1
TABLET ORAL
Qty: 7 TABLET | Refills: 0 | Status: SHIPPED | OUTPATIENT
Start: 2020-09-18 | End: 2020-09-25 | Stop reason: SDUPTHER

## 2020-09-18 RX ORDER — POTASSIUM CHLORIDE 750 MG/1
TABLET, FILM COATED, EXTENDED RELEASE ORAL
Qty: 7 TABLET | Refills: 0 | Status: SHIPPED | OUTPATIENT
Start: 2020-09-18 | End: 2020-09-25 | Stop reason: SDUPTHER

## 2020-09-18 RX ORDER — ATORVASTATIN CALCIUM 20 MG/1
20 TABLET, FILM COATED ORAL DAILY
Qty: 7 TABLET | Refills: 0 | Status: SHIPPED | OUTPATIENT
Start: 2020-09-18 | End: 2020-12-12

## 2020-09-18 NOTE — PROGRESS NOTES
Subjective:      Patient ID: Ted Wells is a 26 y.o. female. 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 She has type 1 diabetes mellitus. There are no diabetic associated symptoms. Symptoms are stable. Current diabetic treatment includes insulin injections. She is currently taking insulin pre-breakfast, pre-lunch, pre-dinner and at bedtime. Her overall blood glucose range is 130-140 mg/dl. (Lab Results       Component                Value               Date                       LABA1C                   6.5                 09/18/2020              ) An ACE inhibitor/angiotensin II receptor blocker is being taken. Hyperlipidemia   This is a chronic problem. The current episode started more than 1 year ago. Current antihyperlipidemic treatment includes statins. Hypothyroidism replacement with Synthroid 50 mcg daily    Hypercholesterolemia on Lipitor no recent lipid panel to review      Patient complains of numbness over the middle 3 4 fingers both hands no trauma    Results for Cami Espitia (MRN [id number]) as of 9/18/2020 15:15   Ref.  Range 6/20/2019 17:15 9/18/2020 15:02 9/18/2020 15:09   Sodium Latest Ref Range: 135 - 144 mEq/L 139     Potassium Latest Ref Range: 3.4 - 4.9 mEq/L 4.7     Chloride Latest Ref Range: 95 - 107 mEq/L 102     CO2 Latest Ref Range: 20 - 31 mEq/L 22     BUN Latest Ref Range: 6 - 20 mg/dL 20     Creatinine Latest Ref Range: 0.50 - 0.90 mg/dL 0.75     Anion Gap Latest Ref Range: 9 - 15 mEq/L 15     GFR Non- Latest Ref Range: >60  >60.0 GFR  Latest Ref Range: >60  >60.0     Glucose Latest Units: mg/dL 116 (H) 166    Calcium Latest Ref Range: 8.5 - 9.9 mg/dL 9.3     Hemoglobin A1C Latest Units: %   6.5   TSH Latest Ref Range: 0.440 - 3.860 uIU/mL 3.080     T4 Free Latest Ref Range: 0.84 - 1.68 ng/dL 1.39     Creatinine, Ur Latest Ref Range: Not Established mg/dL 193.3     Microalbumin Creatinine Ratio Latest Ref Range: 0.0 - 30.0 mg/G see below     Microalbumin, Random Urine Latest Ref Range: Not Established mg/dL <1.20       Patient Active Problem List   Diagnosis    Hypothyroid    Obesity    Diabetic nephropathy (HCC)    Nephrotic syndrome    Uncontrolled type 1 diabetes mellitus without complication (HCC)     Social History     Socioeconomic History    Marital status:      Spouse name: Not on file    Number of children: Not on file    Years of education: Not on file    Highest education level: Not on file   Occupational History    Not on file   Social Needs    Financial resource strain: Not on file    Food insecurity     Worry: Not on file     Inability: Not on file    Transportation needs     Medical: Not on file     Non-medical: Not on file   Tobacco Use    Smoking status: Former Smoker    Smokeless tobacco: Never Used    Tobacco comment: uses electronic cigarette   Substance and Sexual Activity    Alcohol use: Not on file    Drug use: Not on file    Sexual activity: Not on file   Lifestyle    Physical activity     Days per week: Not on file     Minutes per session: Not on file    Stress: Not on file   Relationships    Social connections     Talks on phone: Not on file     Gets together: Not on file     Attends Amish service: Not on file     Active member of club or organization: Not on file     Attends meetings of clubs or organizations: Not on file     Relationship status: Not on file    Intimate partner violence     Fear of current or ex partner: Not on file     Emotionally abused: Not on file     Physically abused: Not on file     Forced sexual activity: Not on file   Other Topics Concern    Not on file   Social History Narrative    Not on file     Allergies   Allergen Reactions    Morphine     Pcn [Penicillins]        Current Outpatient Medications:     furosemide (LASIX) 40 MG tablet, TAKE 1 TABLET BY MOUTH EVERY DAY, Disp: 7 tablet, Rfl: 0    potassium chloride (KLOR-CON) 10 MEQ extended release tablet, TAKE 1 TABLET BY MOUTH EVERY DAY, Disp: 7 tablet, Rfl: 0    atorvastatin (LIPITOR) 20 MG tablet, Take 1 tablet by mouth daily, Disp: 7 tablet, Rfl: 0    lisinopril (PRINIVIL;ZESTRIL) 10 MG tablet, Take 1 tablet by mouth daily, Disp: 30 tablet, Rfl: 3    levothyroxine (SYNTHROID) 50 MCG tablet, Take 1 tablet by mouth daily, Disp: 30 tablet, Rfl: 3    spironolactone (ALDACTONE) 50 MG tablet, One daily, Disp: 30 tablet, Rfl: 3    potassium chloride (KLOR-CON 10) 10 MEQ extended release tablet, TAKE 1 TABLET BY MOUTH EVERY DAY, Disp: 30 tablet, Rfl: 3    insulin lispro (HUMALOG KWIKPEN) 100 UNIT/ML pen, 10 units at each meals, Disp: 30 mL, Rfl: 0    blood glucose test strips (FREESTYLE LITE) strip, 1 each by In Vitro route 4 times daily, Disp: 150 each, Rfl: 0    LANTUS SOLOSTAR 100 UNIT/ML injection pen, INJECT 60 UNITS SUBCUTANEOUSLY DAILY AT BEDTIME, Disp: 15 mL, Rfl: 3    [facility] UNIFINE PENTIPS 32G X 4 MM MISC, use FOUR TIMES DAILY, Disp: 300 each, Rfl: 3    Blood Glucose Monitoring Suppl (FREESTYLE LITE) RAMYA, Please give 1 meter dx 250.01, Disp: 1 Device, Rfl: 0    FREESTYLE LANCETS MISC, Pt test 4x daily dx 250.01, Disp: 150 each, Rfl: 6    Alcohol Swabs (ALCOHOL WIPES) PADS, As directed, Disp: 100 each, Rfl: 06    Insulin Pen Needle (NOVOFINE) 32G X 6 MM MISC, Qid, Disp: 300 each, Rfl: 3    insulin regular (NOVOLIN R) 100 UNIT/ML injection, 10 units with each meals (Patient not taking: Reported on 9/18/2020), Disp: 1 vial, Rfl: 3    Review of Systems   Endocrine: Question:   Is Patient Fasting?/# of Hours     Answer:   y   [address] [facility], [place]     Referral Priority:   Routine     Referral Type:   Eval and Treat     Referral Reason:   Specialty Services Required     Requested Specialty:   Orthopedic Surgery     Number of Visits Requested:   1    POCT Glucose    POCT glycosylated hemoglobin (Hb A1C)     Orders Placed This Encounter   Medications    furosemide (LASIX) 40 MG tablet     Sig: TAKE 1 TABLET BY MOUTH EVERY DAY     Dispense:  7 tablet     Refill:  0    potassium chloride (KLOR-CON) 10 MEQ extended release tablet     Sig: TAKE 1 TABLET BY MOUTH EVERY DAY     Dispense:  7 tablet     Refill:  0    atorvastatin (LIPITOR) 20 MG tablet     Sig: Take 1 tablet by mouth daily     Dispense:  7 tablet     Refill:  0     More than 50% of 25 minutes spent in patient education counseling    Continue Lantus 60 units at bedtime by Humalog 10 units with each meals continue Synthroid 50 mcg daily  Refer patient to orthopedic for carpal tunnel syndrome compliance stressed with testing discussed insulin pump patient pump  Patient declined pump        Rao Rodriguez MD

## 2020-09-28 RX ORDER — SPIRONOLACTONE 50 MG/1
TABLET, FILM COATED ORAL
Qty: 30 TABLET | Refills: 3 | Status: SHIPPED | OUTPATIENT
Start: 2020-09-28 | End: 2020-12-17 | Stop reason: SDUPTHER

## 2020-09-28 RX ORDER — POTASSIUM CHLORIDE 750 MG/1
TABLET, FILM COATED, EXTENDED RELEASE ORAL
Qty: 30 TABLET | Refills: 3 | Status: SHIPPED | OUTPATIENT
Start: 2020-09-28 | End: 2020-12-17

## 2020-09-28 RX ORDER — FUROSEMIDE 40 MG/1
TABLET ORAL
Qty: 30 TABLET | Refills: 3 | Status: SHIPPED | OUTPATIENT
Start: 2020-09-28 | End: 2020-12-17 | Stop reason: SDUPTHER

## 2020-11-03 ENCOUNTER — OFFICE VISIT (OUTPATIENT)
Dept: ORTHOPEDIC SURGERY | Age: 30
End: 2020-11-03
Payer: COMMERCIAL

## 2020-11-03 VITALS
HEART RATE: 77 BPM | WEIGHT: 201.8 LBS | TEMPERATURE: 96.9 F | BODY MASS INDEX: 30.58 KG/M2 | HEIGHT: 68 IN | OXYGEN SATURATION: 97 %

## 2020-11-03 PROCEDURE — L3908 WHO COCK-UP NONMOLDE PRE OTS: HCPCS | Performed by: ORTHOPAEDIC SURGERY

## 2020-11-03 PROCEDURE — 99203 OFFICE O/P NEW LOW 30 MIN: CPT | Performed by: ORTHOPAEDIC SURGERY

## 2020-11-03 ASSESSMENT — ENCOUNTER SYMPTOMS
CHEST TIGHTNESS: 0
ABDOMINAL DISTENTION: 0

## 2020-11-03 NOTE — PROGRESS NOTES
Subjective:      Patient ID: Milind Ford is a 26 y.o. female who presents today for:  Chief Complaint   Patient presents with    Carpal Tunnel     NP-bilateral carpal tunnel, pt says that hand have been an issue for approx 1 year but have progressively gotten worse, no xrays or emgs completed, c/o numbness in fingertips and base of thumb, drops stuff often and has pain when she , pt is right hand dominant, pt says that the right hand hurts a little more because she uses it more        HPI  The patient works in a [address] and uses both hands all the time  Symptoms are worse at night and paresthesia and tingling is worse in her right hand compared to the left    Past Medical History:   Diagnosis Date    Diabetes mellitus type 1 ([address]     Headache(784.0)     Hypothyroidism      No past surgical history on file.   Social History     Socioeconomic History    Marital status:      Spouse name: Not on file    Number of children: Not on file    Years of education: Not on file    Highest education level: Not on file   Occupational History    Not on file   Social Needs    Financial resource strain: Not on file    Food insecurity     Worry: Not on file     Inability: Not on file    Transportation needs     Medical: Not on file     Non-medical: Not on file   Tobacco Use    Smoking status: Former Smoker    Smokeless tobacco: Never Used    Tobacco comment: uses electronic cigarette   Substance and Sexual Activity    Alcohol use: Not on file    Drug use: Not on file    Sexual activity: Not on file   Lifestyle    Physical activity     Days per week: Not on file     Minutes per session: Not on file    Stress: Not on file   Relationships    Social connections     Talks on phone: Not on file     Gets together: Not on file     Attends Yazidism service: Not on file     Active member of club or organization: Not on file     Attends meetings of clubs or organizations: Not on file     Relationship status: Not on file    Intimate partner violence     Fear of current or ex partner: Not on file     Emotionally abused: Not on file     Physically abused: Not on file     Forced sexual activity: Not on file   Other Topics Concern    Not on file   Social History Narrative    Not on file     Family History   Problem Relation Age of Onset    Cancer Mother     High Blood Pressure Mother     Diabetes Sister     Heart Disease Sister      Allergies   Allergen Reactions    Morphine     Pcn [Penicillins]      Current Outpatient Medications on File Prior to Visit   Medication Sig Dispense Refill    spironolactone (ALDACTONE) 50 MG tablet One daily 30 tablet 3    furosemide (LASIX) 40 MG tablet TAKE 1 TABLET BY MOUTH EVERY DAY 30 tablet 3    potassium chloride (KLOR-CON) 10 MEQ extended release tablet TAKE 1 TABLET BY MOUTH EVERY DAY 30 tablet 3    atorvastatin (LIPITOR) 20 MG tablet Take 1 tablet by mouth daily 7 tablet 0    lisinopril (PRINIVIL;ZESTRIL) 10 MG tablet Take 1 tablet by mouth daily 30 tablet 3    levothyroxine (SYNTHROID) 50 MCG tablet Take 1 tablet by mouth daily 30 tablet 3    potassium chloride (KLOR-CON 10) 10 MEQ extended release tablet TAKE 1 TABLET BY MOUTH EVERY DAY 30 tablet 3    insulin lispro (HUMALOG KWIKPEN) 100 UNIT/ML pen 10 units at each meals 30 mL 0    blood glucose test strips (FREESTYLE LITE) strip 1 each by In Vitro route 4 times daily 150 each 0    LANTUS SOLOSTAR 100 UNIT/ML injection pen INJECT 60 UNITS SUBCUTANEOUSLY DAILY AT BEDTIME 15 mL 3    DRUG MART UNIFINE PENTIPS 32G X 4 MM MISC use FOUR TIMES DAILY 300 each 3    Blood Glucose Monitoring Suppl (FREESTYLE LITE) RAMYA Please give 1 meter dx 250.01 1 Device 0    FREESTYLE LANCETS MISC Pt test 4x daily dx 250.01 150 each 6    Alcohol Swabs (ALCOHOL WIPES) PADS As directed 100 each 06    Insulin Pen Needle (NOVOFINE) 32G X 6 MM MISC Qid 300 each 3    insulin regular (NOVOLIN R) 100 UNIT/ML injection 10 units with each meals (Patient not taking: Reported on 9/18/2020) 1 vial 3     No current facility-administered medications on file prior to visit. Controlled Substance Monitoring:    Acute and Chronic Pain Monitoring:   No flowsheet data found. Review of Systems   Constitutional: Negative for activity change and appetite change. Respiratory: Negative for chest tightness. Cardiovascular: Negative for chest pain. Gastrointestinal: Negative for abdominal distention. Genitourinary: Negative for difficulty urinating. Neurological: Negative for headaches. Objective:   Pulse 77   Temp 96.9 °F (36.1 °C) (Temporal)   Ht 5' 7.5\" (1.715 m)   Wt 201 lb 12.8 oz (91.5 kg)   LMP 10/03/2020 (Approximate)   SpO2 97%   BMI 31.14 kg/m²     Physical Exam:  Right and left hands    The contours appear satisfactory  There is no wasting of the small muscles in the hand  She has full flexion extension of all the fingers and her thumb  There is no triggering noticed in any of the fingers and her thumb  Sensations are diminished in the right and the left thumb  Intact in all the fingers  Wrist dorsi and palmar flexion because of mild discomfort and are up to 80 and 90 degrees  Tinel sign is negative  Phalen sign is positive  No discomfort over the cubital tunnel area in both the right and left elbow  Mild discomfort in her neck on flexion extension and rotation    Diagnostic Imaging: The patient has been given a prescription for obtaining an EMG study of her right and left hands to rule out carpal tunnel syndrome      Assessment:       Diagnosis Orders   1. Carpal tunnel syndrome, bilateral upper limbs  EMG    Who cock-up nonmolde pre ots         Plan:      Orders Placed This Encounter   Procedures    EMG     Standing Status:   Future     Standing Expiration Date:   1/2/2021     Order Specific Question:   Which body part?      Answer:   Right upper extremity    Who cock-up nonmolde pre ots     Patient was prescribed a Breg Wrist Cock-Up Brace . The right wrist will require stabilization / immobilization from this semi-rigid / rigid orthosis to improve their function. The orthosis will assist in protecting the affected area, provide functional support and facilitate healing. The patient was educated and fit by a healthcare professional with expert knowledge and specialization in brace application while under the direct supervision of the treating physician. Verbal and written instructions for the use of and application of this item were provided. They were instructed to contact the office immediately should the brace result in increased pain, decreased sensation, increased swelling or worsening of the condition. No orders of the defined types were placed in this encounter. Return After EMG study.      The patient finds the symptoms worse in her right hand and hands right wrist brace was provided  The need to take anti-inflammatory medications for her discomfort  Prescription for EMG study in both the right and left upper extremity has been provided  Follow-up with me perhaps in about 2 weeks      Sydney Bailey MD

## 2020-11-25 ENCOUNTER — HOSPITAL ENCOUNTER (OUTPATIENT)
Dept: NEUROLOGY | Age: 30
Discharge: HOME | End: 2020-11-25
Payer: COMMERCIAL

## 2020-11-25 PROCEDURE — 95910 NRV CNDJ TEST 7-8 STUDIES: CPT

## 2020-11-25 PROCEDURE — 95886 MUSC TEST DONE W/N TEST COMP: CPT

## 2020-11-25 NOTE — PROCEDURES
[address]                      [address], [address] [facility]                             ELECTROMYOGRAM REPORT    PATIENT NAME: Quinn Moseley                   :        1990  MED REC NO:   [id number]                            ROOM:  ACCOUNT NO:   [de-identified]                           ADMIT DATE: 2020  PROVIDER:     Berta Barrera MD    DATE OF EM2020    REFERRING PROVIDER:  Sydney Bailey MD.    REASON FOR THE STUDY:  The patient was having numbness in the hands. Wrist splint was not helping her. FINDINGS:  Motor nerve conduction velocities are slowed in the median  nerves, but normal in the ulnar nerves. F-wave latencies are normal in the ulnar nerves, but delayed in the  median nerves. Distal motor and sensory latencies are normal in the ulnar nerves, but  significantly delayed in the median nerves, being worse on the left  side. On the concentric needle electrode examination, mild denervation changes  are present in the extensor digitorum brevis muscles bilaterally. CLINICAL INTERPRETATION:  EMG studies are showing severe bilateral  median nerve compression neuropathy at the wrists consistent with a  diagnosis of severe bilateral carpal tunnel syndrome. Due to continued symptoms, the patient shall need decompression of the  median nerves. Thank you Dr. Farzaneh Medina for allowing me to see this patient. Delay in the F-wave latencies and slowing of the motor nerve conduction  velocities of the median nerves probably is due to proximal demyelinating  changes due to significant distal compression neuropathy.         Nolvia Arciniega MD    D: 2020 16:56:43       T: 2020 18:27:31     [id number]  Job#: [id number]     Doc#: [id number]    CC:

## 2020-12-12 RX ORDER — ATORVASTATIN CALCIUM 20 MG/1
TABLET, FILM COATED ORAL
Qty: 90 TABLET | Refills: 1 | Status: SHIPPED | OUTPATIENT
Start: 2020-12-12 | End: 2021-06-23

## 2020-12-15 ENCOUNTER — OFFICE VISIT (OUTPATIENT)
Dept: ORTHOPEDIC SURGERY | Age: 30
End: 2020-12-15
Payer: COMMERCIAL

## 2020-12-15 VITALS
WEIGHT: 201 LBS | OXYGEN SATURATION: 97 % | BODY MASS INDEX: 30.46 KG/M2 | HEART RATE: 85 BPM | TEMPERATURE: 96.8 F | HEIGHT: 68 IN

## 2020-12-15 PROCEDURE — 99213 OFFICE O/P EST LOW 20 MIN: CPT | Performed by: ORTHOPAEDIC SURGERY

## 2020-12-15 ASSESSMENT — ENCOUNTER SYMPTOMS
ABDOMINAL DISTENTION: 0
CHEST TIGHTNESS: 0

## 2020-12-15 NOTE — PATIENT INSTRUCTIONS
Patient Education        Carpal Tunnel Syndrome: Care Instructions  Overview     Carpal tunnel syndrome is numbness, tingling, weakness, and pain in your hand, wrist, and sometimes forearm. It is caused by pressure on the median nerve. This nerve and several tough tissues called tendons run through a space in the wrist. This space is called the carpal tunnel. The repeated hand motions used in work and some hobbies and sports can put pressure on the median nerve. Pregnancy can cause carpal tunnel syndrome. Several conditions, such as diabetes, arthritis, and an underactive thyroid, can also cause it. You may be able to limit an activity or change the way you do it to reduce your symptoms. You also can take other steps to feel better. If your symptoms are mild, 1 to 2 weeks of home treatment are likely to ease your pain. Surgery is needed only if other treatments do not work. Follow-up care is a key part of your treatment and safety. Be sure to make and go to all appointments, and call your doctor if you are having problems. It's also a good idea to know your test results and keep a list of the medicines you take. How can you care for yourself at home? · If possible, stop or reduce the activity that causes your symptoms. If you cannot stop the activity, take frequent breaks to rest and stretch or change hand positions to do a task. Try switching hands, such as when using a computer mouse. · Try to avoid bending or twisting your wrists. · Ask your doctor if you can take an over-the-counter pain medicine, such as acetaminophen (Tylenol), ibuprofen (Advil, Motrin), or naproxen (Aleve). Be safe with medicines. Read and follow all instructions on the label. · If your doctor prescribes corticosteroid medicine to help reduce pain and swelling, take it exactly as prescribed. Call your doctor if you think you are having a problem with your medicine.   · Put ice or a cold pack on your wrist for 10 to 20 minutes at a time to ease pain. Put a thin cloth between the ice and your skin. · If your doctor or your physical or occupational therapist tells you to wear a wrist splint, wear it as directed to keep your wrist in a neutral position. This also eases pressure on your median nerve. · Ask your doctor whether you should have physical or occupational therapy to learn how to do tasks differently. · Try a yoga class to stretch your muscles and build strength in your hands and wrists. Yoga has been shown to ease carpal tunnel symptoms. To prevent carpal tunnel  · When working at a [employer], keep your hands and wrists in line with your forearms. Hold your elbows close to your sides. Take a break every 10 to 15 minutes. · Try these exercises:  ? Warm up: Rotate your wrist up, down, and from side to side. Repeat this 4 times. Stretch your fingers far apart, relax them, then stretch them again. Repeat 4 times. Stretch your thumb by pulling it back gently, holding it, and then releasing it. Repeat 4 times. ? Prayer stretch: Start with your palms together in front of your chest just below your chin. Slowly lower your hands toward your waistline while keeping your hands close to your stomach and your palms together until you feel a mild to moderate stretch under your forearms. Hold for 10 to 20 seconds. Repeat 4 times. ? Wrist flexor stretch: Hold your arm in front of you with your palm up. Bend your wrist, pointing your hand toward the floor. With your other hand, gently bend your wrist further until you feel a mild to moderate stretch in your forearm. Hold for 10 to 20 seconds. Repeat 4 times. ? Wrist extensor stretch: Repeat the steps for the wrist flexor stretch, but begin with your extended hand palm down. · Squeeze a rubber exercise ball several times a day to keep your hands and fingers strong. · Avoid holding objects (such as a book) in one position for a long time. When possible, use your whole hand to grasp an object. Using just the thumb and index finger can put stress on the wrist.  · Do not smoke. It can make this condition worse by reducing blood flow to the median nerve. If you need help quitting, talk to your doctor about stop-smoking programs and medicines. These can increase your chances of quitting for good. When should you call for help? Watch closely for changes in your health, and be sure to contact your doctor if:    · Your pain or other problems do not get better with home care.     · You want more information about physical or occupational therapy.     · You have side effects of your corticosteroid medicine, such as:  ? Weight gain. ? Mood changes. ? Trouble sleeping. ? Bruising easily.     · You have any other problems with your medicine. Where can you learn more? Go to https://[employer]Centive.[employer]. org and sign in to your [employer] account. Enter R432 in the [employer] box to learn more about \"Carpal Tunnel Syndrome: Care Instructions. \"     If you do not have an account, please click on the \"Sign Up Now\" link. Current as of: March 2, 2020               Content Version: 12.6  © 4114-0406 [employer]. Care instructions adapted under license by [facility] ([facility]). If you have questions about a medical condition or this instruction, always ask your healthcare professional. Luis Daniel Schwab any warranty or liability for your use of this information.

## 2020-12-15 NOTE — PROGRESS NOTES
Subjective:      Patient ID: Chelsea Haley is a 26 y.o. female who presents today for:  Chief Complaint   Patient presents with    Follow-up     pt here for f/u of carpal tunnel, EMG DONE 11/25/20, pt states she still has alot of pain in both hands, pt says pain is 7/10 and is the worst at night. 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 Saida Crump    Past Medical History:   Diagnosis Date    Diabetes mellitus type 1 ([facility] [address]     Headache(784.0)     Hypothyroidism      History reviewed. No pertinent surgical history.   Social History     Socioeconomic History    Marital status:      Spouse name: Not on file    Number of children: Not on file    Years of education: Not on file    Highest education level: Not on file   Occupational History    Not on file   Social Needs    Financial resource strain: Not on file    Food insecurity     Worry: Not on file     Inability: Not on file    Transportation needs     Medical: Not on file     Non-medical: Not on file   Tobacco Use    Smoking status: Former Smoker    Smokeless tobacco: Never Used    Tobacco comment: uses electronic cigarette   Substance and Sexual Activity    Alcohol use: Not on file    Drug use: Not on file    Sexual activity: Not on file   Lifestyle    Physical activity     Days per week: Not on file     Minutes per session: Not on file    Stress: Not on file   Relationships    Social connections     Talks on phone: Not on file     Gets together: Not on file     Attends [employer] service: Not on file     Active member of club or organization: Not on file     Attends meetings of clubs or organizations: Not on file     Relationship status: Not on file    Intimate partner violence     Fear of current or ex partner: Not on file     Emotionally abused: Not on file     Physically and Chronic Pain Monitoring:   No flowsheet data found. Review of Systems   Constitutional: Negative for activity change and appetite change. Respiratory: Negative for chest tightness. Cardiovascular: Negative for chest pain. Gastrointestinal: Negative for abdominal distention. Genitourinary: Negative for difficulty urinating. Neurological: Negative for headaches. Objective:   Pulse 85   Temp 96.8 °F (36 °C) (Temporal)   Ht 5' 7.5\" (1.715 m)   Wt 201 lb (91.2 kg)   SpO2 97%   BMI 31.02 kg/m²     Physical Exam:    Right and left hand  Is no small muscle wasting noted  She has satisfactory range of motion of the fingers and thumb  Sensations are diminished in the thumb middle index finger and the ring finger  Mild discomfort felt over the carpal canal area  Tinel sign is negative  Phalen sign is positive    Diagnostic Imaging:    CLINICAL INTERPRETATION:  EMG studies are showing severe bilateral  median nerve compression neuropathy at the wrists consistent with a  diagnosis of severe bilateral carpal tunnel syndrome.     Due to continued symptoms, the patient shall need decompression of the  median nerves.         Assessment:       Diagnosis Orders   1. Bilateral carpal tunnel syndrome           Plan:      Since this EMG study has shown severe compression of the median nerve  I have discussed with her surgical intervention options for the carpal tunnel syndrome  I explained the surgical procedure in detail,, postoperative. Of immobilization and gradual return to activities  She would need to be off work at least for 2 weeks if not longer, until she gains good range of motion and alleviation of her symptoms and the strength as well  I provided her information regarding carpal tunnel syndrome    Surgical procedure was explained in detail, the risks and benefits of surgery were also discussed  Risk of anesthesia which is a Biers block, risk of injury to the vessel, nerve, tendon. safety & equipment needs for total joint patients  [] Other: ____________________________________________________  Consults: Medical/Cardiac Clearance done by  ____________________  PRE-OP ORDERS:   Allergies: Morphine and Pcn [penicillins] Latex Allergies:             Diabetic:           [] IV ________________________  [x] IV Start with J-loop     Preprinted Orders: Attached [] Yes [] No   ANTIBIOTIC PRE-OP: [x] ANCEF 2 gram IVPB if > 120 kg 3 grams IVPB within 1 hour of incision, if ALLERGIC, use VANCOMYCIN 1 gram IV, 2 hours pre-op  [] TXA Protocol [] Other:   [x] NPO   [] Betablocker (if needed) _____________________________________   [] Knee high anti-embolic hose [] Thigh high anti-embolic hose   Other: ______________________________________________________    Physician Signature Required:      Date/Time: 12/15/2020

## 2020-12-15 NOTE — LETTER
[address]  [address] [facility] [place] [address]  Phone: [phone]  Fax: [phone]    Hillary Pierson MD        December 15, 2020     Patient: Panfilo Hernandez   YOB: 1990   Date of Visit: 12/15/2020       To Whom it May Concern:    Doreen Thomas was seen in my clinic on 12/15/2020. Please excuse [employer] on 12/21/2020 for Pre-Admission testing. [employer] must have pre-admission testing one week prior to Surgery date 12/29/2020. If you have any questions or concerns, please don't hesitate to call.     Sincerely,         Hillary Pierson MD

## 2020-12-17 RX ORDER — FUROSEMIDE 40 MG/1
TABLET ORAL
Qty: 30 TABLET | Refills: 3 | Status: SHIPPED | OUTPATIENT
Start: 2020-12-17 | End: 2021-04-26 | Stop reason: SDUPTHER

## 2020-12-17 RX ORDER — SPIRONOLACTONE 50 MG/1
TABLET, FILM COATED ORAL
Qty: 30 TABLET | Refills: 3 | Status: SHIPPED | OUTPATIENT
Start: 2020-12-17 | End: 2021-05-03 | Stop reason: SDUPTHER

## 2020-12-17 RX ORDER — POTASSIUM CHLORIDE 750 MG/1
TABLET, FILM COATED, EXTENDED RELEASE ORAL
Qty: 90 TABLET | Refills: 1 | Status: SHIPPED | OUTPATIENT
Start: 2020-12-17 | End: 2020-12-21

## 2020-12-21 ENCOUNTER — OFFICE VISIT (OUTPATIENT)
Dept: ORTHOPEDIC SURGERY | Age: 30
End: 2020-12-21
Payer: COMMERCIAL

## 2020-12-21 ENCOUNTER — NURSE ONLY (OUTPATIENT)
Dept: PRIMARY CARE CLINIC | Age: 30
End: 2020-12-21

## 2020-12-21 VITALS
HEART RATE: 85 BPM | BODY MASS INDEX: 30.04 KG/M2 | HEIGHT: 68 IN | SYSTOLIC BLOOD PRESSURE: 112 MMHG | TEMPERATURE: 97.1 F | WEIGHT: 198.2 LBS | RESPIRATION RATE: 14 BRPM | DIASTOLIC BLOOD PRESSURE: 72 MMHG | OXYGEN SATURATION: 98 %

## 2020-12-21 DIAGNOSIS — Z01.818 PREOP EXAMINATION: ICD-10-CM

## 2020-12-21 LAB
ANION GAP SERPL CALCULATED.3IONS-SCNC: 10 MEQ/L (ref 9–15)
BUN BLDV-MCNC: 17 MG/DL (ref 6–20)
CALCIUM SERPL-MCNC: 9.1 MG/DL (ref 8.5–9.9)
CHLORIDE BLD-SCNC: 105 MEQ/L (ref 95–107)
CO2: 24 MEQ/L (ref 20–31)
CREAT SERPL-MCNC: 0.7 MG/DL (ref 0.5–0.9)
GFR AFRICAN AMERICAN: >60
GFR NON-AFRICAN AMERICAN: >60
GLUCOSE BLD-MCNC: 196 MG/DL (ref 70–99)
HCG(URINE) PREGNANCY TEST: NEGATIVE
HCT VFR BLD CALC: 40.1 % (ref 37–47)
HEMOGLOBIN: 13.1 G/DL (ref 12–16)
MCH RBC QN AUTO: 27.5 PG (ref 27–31.3)
MCHC RBC AUTO-ENTMCNC: 32.7 % (ref 33–37)
MCV RBC AUTO: 84.1 FL (ref 82–100)
PDW BLD-RTO: 14.4 % (ref 11.5–14.5)
PLATELET # BLD: 221 K/UL (ref 130–400)
POTASSIUM SERPL-SCNC: 4.3 MEQ/L (ref 3.4–4.9)
RBC # BLD: 4.77 M/UL (ref 4.2–5.4)
SODIUM BLD-SCNC: 139 MEQ/L (ref 135–144)
WBC # BLD: 7.8 K/UL (ref 4.8–10.8)

## 2020-12-21 PROCEDURE — 99202 OFFICE O/P NEW SF 15 MIN: CPT | Performed by: PHYSICIAN ASSISTANT

## 2020-12-21 ASSESSMENT — ENCOUNTER SYMPTOMS
VOMITING: 0
NAUSEA: 0
STRIDOR: 0
CONSTIPATION: 0
BACK PAIN: 0
DIARRHEA: 0
SHORTNESS OF BREATH: 0
COLOR CHANGE: 0
WHEEZING: 0

## 2020-12-21 NOTE — PROGRESS NOTES
[facility]    H&P: Preadmission Testing     Patient: Florence Puentes  YOB: 1990  MRN: [id number]    Subjective:     Chief Complaint   Patient presents with    Pre-op Exam     PAT RIGHT HAND CARPAL TUNNEL DECOMPRESSION 12/29 w/ Festus Weaver       HPI: Florence Puentes is a 26 y.o. female w/ pertinent PMHx of diabetes is here her for preop evaluation for carpal tunnel release scheduled for next Tuesday with Dr. Carli Pastrana    Sign of her diabetes which is very well controlled with insulin she has no other medical conditions. She denies any heart or lung issues. There is no wheezing, chest pain, leg swelling, Covid-like symptoms. She is occasionally sexually active. Not on any blood thinners. Not a smoker. Past Medical History:        Diagnosis Date    Diabetes mellitus type 1 ([address]     Headache(784.0)     Hypothyroidism      Past Surgical History:    No past surgical history on file.     Medications Prior to Admission:    Current Outpatient Medications   Medication Sig Dispense Refill    potassium chloride (KLOR-CON) 10 MEQ extended release tablet TAKE 1 TABLET BY MOUTH EVERY DAY 90 tablet 1    furosemide (LASIX) 40 MG tablet TAKE 1 TABLET BY MOUTH EVERY DAY 30 tablet 3    spironolactone (ALDACTONE) 50 MG tablet One daily 30 tablet 3    atorvastatin (LIPITOR) 20 MG tablet TAKE 1 TABLET BY MOUTH EVERY DAY 90 tablet 1    lisinopril (PRINIVIL;ZESTRIL) 10 MG tablet Take 1 tablet by mouth daily 30 tablet 3    levothyroxine (SYNTHROID) 50 MCG tablet Take 1 tablet by mouth daily 30 tablet 3    potassium chloride (KLOR-CON 10) 10 MEQ extended release tablet TAKE 1 TABLET BY MOUTH EVERY DAY 30 tablet 3    insulin lispro (HUMALOG KWIKPEN) 100 UNIT/ML pen 10 units at each meals 30 mL 0    insulin regular (NOVOLIN R) 100 UNIT/ML injection 10 units with each meals 1 vial 3    blood glucose test strips (FREESTYLE LITE) strip 1 each by In Vitro route 4 times daily 150 each 0    LANTUS SOLOSTAR 100 UNIT/ML injection pen INJECT 60 UNITS SUBCUTANEOUSLY DAILY AT BEDTIME 15 mL 3    DRUG MART UNIFINE PENTIPS 32G X 4 MM MISC use FOUR TIMES DAILY 300 each 3    Blood Glucose Monitoring Suppl (FREESTYLE LITE) RAMYA Please give 1 meter dx 250.01 1 Device 0    FREESTYLE LANCETS MISC Pt test 4x daily dx 250.01 150 each 6    Alcohol Swabs (ALCOHOL WIPES) PADS As directed 100 each 06    Insulin Pen Needle (NOVOFINE) 32G X 6 MM MISC Qid 300 each 3     No current facility-administered medications for this visit.         Allergies:    Morphine and Pcn [penicillins]    Social History:   Social History     Socioeconomic History    Marital status:      Spouse name: Not on file    Number of children: Not on file    Years of education: Not on file    Highest education level: Not on file   Occupational History    Not on file   Social Needs    Financial resource strain: Not on file    Food insecurity     Worry: Not on file     Inability: Not on file    Transportation needs     Medical: Not on file     Non-medical: Not on file   Tobacco Use    Smoking status: Former Smoker    Smokeless tobacco: Never Used    Tobacco comment: uses electronic cigarette   Substance and Sexual Activity    Alcohol use: Not on file    Drug use: Not on file    Sexual activity: Not on file   Lifestyle    Physical activity     Days per week: Not on file     Minutes per session: Not on file    Stress: Not on file   Relationships    Social connections     Talks on phone: Not on file     Gets together: Not on file     Attends [employer] service: Not on file     Active member of club or organization: Not on file     Attends meetings of clubs or organizations: Not on file     Relationship status: Not on file    Intimate partner violence     Fear of current or ex partner: Not on file     Emotionally abused: Not on file     Physically abused: Not on file     Forced sexual activity: Not on file   Other Topics Concern    Not on file   Social History Narrative    Not on file       Family History:       Problem Relation Age of Onset    Cancer Mother     High Blood Pressure Mother     Diabetes Sister     Heart Disease Sister          Review of Systems   Constitutional: Negative for appetite change and fever. Respiratory: Negative for shortness of breath, wheezing and stridor. Cardiovascular: Negative for chest pain and palpitations. Gastrointestinal: Negative for constipation, diarrhea, nausea and vomiting. Musculoskeletal: Negative for arthralgias, back pain, joint swelling, myalgias and neck pain. Skin: Negative for color change and rash. Neurological: Positive for numbness. Negative for dizziness, speech difficulty, weakness and light-headedness. Objective:   Ht 5' 7.5\" (1.715 m)   BMI 31.02 kg/m²     Physical Exam  Constitutional:       General: She is not in acute distress. Appearance: Normal appearance. She is not ill-appearing. HENT:      Head: Normocephalic. Nose: Nose normal. No congestion or rhinorrhea. Mouth/Throat:      Mouth: Mucous membranes are moist.      Pharynx: Oropharynx is clear. No oropharyngeal exudate or posterior oropharyngeal erythema. Eyes:      Extraocular Movements: Extraocular movements intact. Pupils: Pupils are equal, round, and reactive to light. Cardiovascular:      Rate and Rhythm: Normal rate and regular rhythm. Pulses: Normal pulses. Heart sounds: Normal heart sounds. No murmur. Pulmonary:      Effort: Pulmonary effort is normal.      Breath sounds: Normal breath sounds. No wheezing, rhonchi or rales. Abdominal:      General: Abdomen is flat. Bowel sounds are normal.      Palpations: Abdomen is soft. Tenderness: There is no abdominal tenderness. Skin:     General: Skin is warm and dry. Capillary Refill: Capillary refill takes less than 2 seconds.       Comments: No swelling or erythema over the incision site Neurological:      General: No focal deficit present. Mental Status: She is alert and oriented to person, place, and time. Radiographs and Laboratory Studies:     Laboratory Studies:   No results found for: WBC, HGB, HCT, MCV, PLT  No results found for: SEDRATE  No results found for: CRP    Assessment and Plan:      Diagnosis Orders   1. Preop examination  Basic Metabolic Panel    CBC    BTROM-12 Ambulatory    Pregnancy, Urine     Patient was instructed to quarantine themself until the day of surgery after getting the COVID test   Blood work was ordered and will be reviewed. I'll see them back 2 weeks postoperatively.     Cheryl Lopez PA-C  [facility]  [phone]

## 2020-12-22 LAB
SARS-COV-2: NOT DETECTED
SOURCE: NORMAL

## 2020-12-24 ENCOUNTER — ANESTHESIA EVENT (OUTPATIENT)
Dept: OPERATING ROOM | Age: 30
End: 2020-12-24
Payer: COMMERCIAL

## 2020-12-29 ENCOUNTER — HOSPITAL ENCOUNTER (OUTPATIENT)
Age: 30
Setting detail: OUTPATIENT SURGERY
Discharge: HOME | End: 2020-12-29
Attending: ORTHOPAEDIC SURGERY | Admitting: ORTHOPAEDIC SURGERY
Payer: COMMERCIAL

## 2020-12-29 ENCOUNTER — ANESTHESIA (OUTPATIENT)
Dept: OPERATING ROOM | Age: 30
End: 2020-12-29
Payer: COMMERCIAL

## 2020-12-29 VITALS
SYSTOLIC BLOOD PRESSURE: 96 MMHG | RESPIRATION RATE: 19 BRPM | DIASTOLIC BLOOD PRESSURE: 54 MMHG | OXYGEN SATURATION: 100 %

## 2020-12-29 VITALS
BODY MASS INDEX: 29.55 KG/M2 | RESPIRATION RATE: 18 BRPM | HEIGHT: 68 IN | HEART RATE: 72 BPM | WEIGHT: 195 LBS | OXYGEN SATURATION: 99 % | SYSTOLIC BLOOD PRESSURE: 99 MMHG | TEMPERATURE: 97.7 F | DIASTOLIC BLOOD PRESSURE: 68 MMHG

## 2020-12-29 LAB
GLUCOSE BLD-MCNC: 128 MG/DL (ref 60–115)
GLUCOSE BLD-MCNC: 141 MG/DL (ref 60–115)
HCG, URINE, POC: NEGATIVE
Lab: NORMAL
NEGATIVE QC PASS/FAIL: NORMAL
PERFORMED ON: ABNORMAL
PERFORMED ON: ABNORMAL
POSITIVE QC PASS/FAIL: NORMAL

## 2020-12-29 PROCEDURE — 6360000002 HC RX W HCPCS: Performed by: ORTHOPAEDIC SURGERY

## 2020-12-29 PROCEDURE — 7100000011 HC PHASE II RECOVERY - ADDTL 15 MIN: Performed by: ORTHOPAEDIC SURGERY

## 2020-12-29 PROCEDURE — 2709999900 HC NON-CHARGEABLE SUPPLY: Performed by: ORTHOPAEDIC SURGERY

## 2020-12-29 PROCEDURE — 3600000003 HC SURGERY LEVEL 3 BASE: Performed by: ORTHOPAEDIC SURGERY

## 2020-12-29 PROCEDURE — 2580000003 HC RX 258: Performed by: ANESTHESIOLOGY

## 2020-12-29 PROCEDURE — 64721 CARPAL TUNNEL SURGERY: CPT | Performed by: ORTHOPAEDIC SURGERY

## 2020-12-29 PROCEDURE — 3700000001 HC ADD 15 MINUTES (ANESTHESIA): Performed by: ORTHOPAEDIC SURGERY

## 2020-12-29 PROCEDURE — 2500000003 HC RX 250 WO HCPCS: Performed by: ORTHOPAEDIC SURGERY

## 2020-12-29 PROCEDURE — 7100000010 HC PHASE II RECOVERY - FIRST 15 MIN: Performed by: ORTHOPAEDIC SURGERY

## 2020-12-29 PROCEDURE — 6360000002 HC RX W HCPCS: Performed by: NURSE ANESTHETIST, CERTIFIED REGISTERED

## 2020-12-29 PROCEDURE — 2500000003 HC RX 250 WO HCPCS: Performed by: NURSE ANESTHETIST, CERTIFIED REGISTERED

## 2020-12-29 PROCEDURE — 3700000000 HC ANESTHESIA ATTENDED CARE: Performed by: ORTHOPAEDIC SURGERY

## 2020-12-29 PROCEDURE — 2580000003 HC RX 258: Performed by: ORTHOPAEDIC SURGERY

## 2020-12-29 PROCEDURE — 3600000013 HC SURGERY LEVEL 3 ADDTL 15MIN: Performed by: ORTHOPAEDIC SURGERY

## 2020-12-29 RX ORDER — MAGNESIUM HYDROXIDE 1200 MG/15ML
LIQUID ORAL CONTINUOUS PRN
Status: COMPLETED | OUTPATIENT
Start: 2020-12-29 | End: 2020-12-29

## 2020-12-29 RX ORDER — ONDANSETRON 2 MG/ML
INJECTION INTRAMUSCULAR; INTRAVENOUS PRN
Status: DISCONTINUED | OUTPATIENT
Start: 2020-12-29 | End: 2020-12-29 | Stop reason: SDUPTHER

## 2020-12-29 RX ORDER — FENTANYL CITRATE 50 UG/ML
25 INJECTION, SOLUTION INTRAMUSCULAR; INTRAVENOUS EVERY 5 MIN PRN
Status: DISCONTINUED | OUTPATIENT
Start: 2020-12-29 | End: 2020-12-29 | Stop reason: HOSPADM

## 2020-12-29 RX ORDER — BUPIVACAINE HYDROCHLORIDE 2.5 MG/ML
INJECTION, SOLUTION EPIDURAL; INFILTRATION; INTRACAUDAL PRN
Status: DISCONTINUED | OUTPATIENT
Start: 2020-12-29 | End: 2020-12-29 | Stop reason: ALTCHOICE

## 2020-12-29 RX ORDER — PROPOFOL 10 MG/ML
INJECTION, EMULSION INTRAVENOUS CONTINUOUS PRN
Status: DISCONTINUED | OUTPATIENT
Start: 2020-12-29 | End: 2020-12-29 | Stop reason: SDUPTHER

## 2020-12-29 RX ORDER — METOCLOPRAMIDE HYDROCHLORIDE 5 MG/ML
10 INJECTION INTRAMUSCULAR; INTRAVENOUS
Status: DISCONTINUED | OUTPATIENT
Start: 2020-12-29 | End: 2020-12-29 | Stop reason: HOSPADM

## 2020-12-29 RX ORDER — DIPHENHYDRAMINE HYDROCHLORIDE 50 MG/ML
12.5 INJECTION INTRAMUSCULAR; INTRAVENOUS
Status: DISCONTINUED | OUTPATIENT
Start: 2020-12-29 | End: 2020-12-29 | Stop reason: HOSPADM

## 2020-12-29 RX ORDER — ONDANSETRON 2 MG/ML
4 INJECTION INTRAMUSCULAR; INTRAVENOUS
Status: DISCONTINUED | OUTPATIENT
Start: 2020-12-29 | End: 2020-12-29 | Stop reason: HOSPADM

## 2020-12-29 RX ORDER — MIDAZOLAM HYDROCHLORIDE 1 MG/ML
INJECTION INTRAMUSCULAR; INTRAVENOUS PRN
Status: DISCONTINUED | OUTPATIENT
Start: 2020-12-29 | End: 2020-12-29 | Stop reason: SDUPTHER

## 2020-12-29 RX ORDER — LIDOCAINE HYDROCHLORIDE 5 MG/ML
INJECTION, SOLUTION INFILTRATION; INTRAVENOUS PRN
Status: DISCONTINUED | OUTPATIENT
Start: 2020-12-29 | End: 2020-12-29 | Stop reason: SDUPTHER

## 2020-12-29 RX ORDER — 0.9 % SODIUM CHLORIDE 0.9 %
500 INTRAVENOUS SOLUTION INTRAVENOUS
Status: DISCONTINUED | OUTPATIENT
Start: 2020-12-29 | End: 2020-12-29 | Stop reason: HOSPADM

## 2020-12-29 RX ORDER — PROPOFOL 10 MG/ML
INJECTION, EMULSION INTRAVENOUS PRN
Status: DISCONTINUED | OUTPATIENT
Start: 2020-12-29 | End: 2020-12-29 | Stop reason: SDUPTHER

## 2020-12-29 RX ORDER — LABETALOL HYDROCHLORIDE 5 MG/ML
5 INJECTION, SOLUTION INTRAVENOUS EVERY 10 MIN PRN
Status: DISCONTINUED | OUTPATIENT
Start: 2020-12-29 | End: 2020-12-29 | Stop reason: HOSPADM

## 2020-12-29 RX ORDER — MEPERIDINE HYDROCHLORIDE 25 MG/ML
12.5 INJECTION INTRAMUSCULAR; INTRAVENOUS; SUBCUTANEOUS EVERY 5 MIN PRN
Status: DISCONTINUED | OUTPATIENT
Start: 2020-12-29 | End: 2020-12-29 | Stop reason: HOSPADM

## 2020-12-29 RX ORDER — CEFAZOLIN SODIUM 2 G/50ML
2 SOLUTION INTRAVENOUS
Status: COMPLETED | OUTPATIENT
Start: 2020-12-29 | End: 2020-12-29

## 2020-12-29 RX ORDER — SODIUM CHLORIDE, SODIUM LACTATE, POTASSIUM CHLORIDE, CALCIUM CHLORIDE 600; 310; 30; 20 MG/100ML; MG/100ML; MG/100ML; MG/100ML
INJECTION, SOLUTION INTRAVENOUS CONTINUOUS
Status: DISCONTINUED | OUTPATIENT
Start: 2020-12-29 | End: 2020-12-29 | Stop reason: HOSPADM

## 2020-12-29 RX ADMIN — PROPOFOL 50 MG: 10 INJECTION, EMULSION INTRAVENOUS at 08:41

## 2020-12-29 RX ADMIN — MIDAZOLAM HYDROCHLORIDE 2 MG: 2 INJECTION, SOLUTION INTRAMUSCULAR; INTRAVENOUS at 08:32

## 2020-12-29 RX ADMIN — CEFAZOLIN SODIUM 2 G: 2 SOLUTION INTRAVENOUS at 08:45

## 2020-12-29 RX ADMIN — SODIUM CHLORIDE, POTASSIUM CHLORIDE, SODIUM LACTATE AND CALCIUM CHLORIDE 1000 ML: 600; 310; 30; 20 INJECTION, SOLUTION INTRAVENOUS at 07:22

## 2020-12-29 RX ADMIN — ONDANSETRON 4 MG: 2 INJECTION INTRAMUSCULAR; INTRAVENOUS at 08:50

## 2020-12-29 RX ADMIN — LIDOCAINE HYDROCHLORIDE 50 ML: 5 INJECTION, SOLUTION INFILTRATION; INTRAVENOUS at 08:39

## 2020-12-29 RX ADMIN — PROPOFOL 120 MCG/KG/MIN: 10 INJECTION, EMULSION INTRAVENOUS at 08:43

## 2020-12-29 ASSESSMENT — PULMONARY FUNCTION TESTS
PIF_VALUE: 1
PIF_VALUE: 0
PIF_VALUE: 1
PIF_VALUE: 2
PIF_VALUE: 1
PIF_VALUE: 0
PIF_VALUE: 1
PIF_VALUE: 0
PIF_VALUE: 1
PIF_VALUE: 1

## 2020-12-29 NOTE — OP NOTE
Operative Note      Patient: Bautista Darling  YOB: 1990  MRN: [id number]    Date of Procedure: 12/29/2020    Pre-Op Diagnosis: RIGHT HAND CARPAL TUNNEL SYNDROME    Post-Op Diagnosis: Same       Procedure(s):  RIGHT HAND CARPAL TUNNEL DECOMPRESSION    Surgeon(s): Sydney Bailey MD    Assistant:   Physician Assistant: Val Baker PA-C    Anesthesia: Donsi Block    Estimated Blood Loss (mL): Minimal    Complications: None    Specimens:   * No specimens in log *    Implants:  * No implants in log *      Drains: * No LDAs found *    Findings: Severe compression of the median nerve in the carpal canal, by a tight carpal ligament    Detailed Description of Procedure: Indications for surgery    Mrs. Austin Sanchez is a 56-year-old female, who presents with features of tingling numbness and paresthesia in her right and left upper extremity. She has had EMG study which revealed evidence of carpal tunnel syndrome in the right hand. Severe compression of the median nerve in the carpal canal was noted on the EMG studies in both wrists. patient had tried all conservative measures and failed. Surgical procedure of carpal tunnel decompression was explained in detail, the risks and benefits of surgery were also discussed. Risk of anesthesia which is a Biers block, risk of injury to the vessel, nerve, tendon. Postoperative complications such as hematoma formation, infection, DVT, continued pain in the hand, neuroma formation, loss of sensations in the finger, wound healing problems, possible need for second surgery, recurrence of the symptoms, complications from her medical condition have all been discussed  Patient understands all the risks and benefits and consents for the surgical procedure    Operative note    Patient was brought to the operating room and a Biers block was given by the anesthetic services. The right upper extremity was prepped and draped chlorhexidine prep was used.   The patient had 2 g of Ancef given prior to the start of the procedure. The timeout was called prior to the start of the procedure. The procedure was done with magnifying loops. An incision was made in line with the ring finger close to the thenar crease. Skin and subtenons tissue were carefully cut and retracted. The incision measured approximately 2 cm. Careful dissection was done in the subcutaneous plane till the carpal ligament was reached. An incision was made into the carpal canal using a 15 blade through the carpal ligament. Care was taken to avoid any damage to the underlying neural and vascular structures. The incision measured about 2 to 3 mm only. Annell Solan was now introduced into the carpal canal under the carpal ligament. Blunt tipped scissors was taken the decompression was done first proximally. There was significant thickness noticed in the carpal ligament. Care was taken to avoid any damage to the underlying neural and vascular and tendinous structures. After complete decompression had been performed proximally. Garner Heymann was now passed proximally to make sure that the decompression had been complete. Garner Heymann was now placed under the carpal ligament and the decompression was done distally. Care was taken once again to avoid any damage to the underlying neural and tendinous structures. The median nerve was noted to be mildly compressed  with evidence of hyperemia present along the area of the carpal canal.  The tendinous structures were moving freely within the carpal canal.  Surgical site was irrigated out completely. Skin was closed with interrupted nylon sutures. The skin was also infiltrated with quarter percent Marcaine. Approximately 8 to 10 cc of Marcaine was infiltrated into the skin. Surgical site was dressed with Xeroform, gauze, web roll and an Ace wrap. Patient made a satisfactory recovery. Postoperative instructions will be provided.   She will be evaluated in orthopedic office in approximately 10 days.       Electronically signed by Issac Ruiz MD on 12/29/2020 at 9:10 AM

## 2020-12-29 NOTE — ANESTHESIA PRE PROCEDURE
Department of Anesthesiology  Preprocedure Note       Name:  Lamar Smith   Age:  26 y.o.  :  1990                                          MRN:  [id number]         Date:  2020      Surgeon: Tiffany Prescott): Neida Arboleda MD    Procedure: Procedure(s):  RIGHT HAND CARPAL TUNNEL DECOMPRESSION. REQUESTING A.M. (PAT WITH ALIYA )    Medications prior to admission:   Prior to Admission medications    Medication Sig Start Date End Date Taking?  Authorizing Provider   furosemide (LASIX) 40 MG tablet TAKE 1 TABLET BY MOUTH EVERY DAY 20  Yes Carlos Lynn MD   spironolactone (ALDACTONE) 50 MG tablet One daily 20  Yes Carlos Lynn MD   atorvastatin (LIPITOR) 20 MG tablet TAKE 1 TABLET BY MOUTH EVERY DAY 20  Yes Carlos Lynn MD   lisinopril (PRINIVIL;ZESTRIL) 10 MG tablet Take 1 tablet by mouth daily 20  Yes Carlos Lynn MD   levothyroxine (SYNTHROID) 50 MCG tablet Take 1 tablet by mouth daily 20  Yes Carlos Lynn MD   potassium chloride (KLOR-CON 10) 10 MEQ extended release tablet TAKE 1 TABLET BY MOUTH EVERY DAY 19  Yes Carlos Lynn MD   insulin regular (NOVOLIN R) 100 UNIT/ML injection 10 units with each meals 19  Yes Carlos Lynn MD   blood glucose test strips (FREESTYLE LITE) strip 1 each by In Vitro route 4 times daily 19  Yes Carlos Lynn MD   DRUG MART UNIFINE PENTIPS 32G X 4 MM MISC use FOUR TIMES DAILY 9/29/15  Yes Carlos Lynn MD   Blood Glucose Monitoring Suppl (FREESTYLE LITE) RAMYA Please give 1 meter dx 250.01 5/19/15  Yes Carlos Lynn MD   FREESTYLE LANCETS MISC Pt test 4x daily dx 250.01 5/19/15  Yes Carlos Lynn MD   Alcohol Swabs (ALCOHOL WIPES) PADS As directed 5/12/15  Yes Carlos Lynn MD   Insulin Pen Needle (NOVOFINE) 32G X 6 MM MISC Qid 14  Yes Carlos Lynn MD   insulin lispro (HUMALOG KWIKPEN) 100 UNIT/ML pen 10 units at each meals 19   Carlos Lynn MD       Current medications:    Current Facility-Administered Medications   Medication Dose Route Frequency Provider Last Rate Last Admin    vancomycin 1000 mg IVPB in 250 mL D5W addavial  1,000 mg Intravenous On Call to [id number] Haritha Cross MD        lactated ringers infusion   Intravenous Continuous Wilma Avila  mL/hr at 12/29/20 0722 1,000 mL at 12/29/20 3384       Allergies: Allergies   Allergen Reactions    Morphine Hives    Pcn [Penicillins] Hives       Problem List:    Patient Active Problem List   Diagnosis Code    Hypothyroid E03.9    Obesity E66.9    Diabetic nephropathy ([facility] [address] E11.21    Nephrotic syndrome N04.9    Uncontrolled type 1 diabetes mellitus without complication [id number]       Past Medical History:        Diagnosis Date    Diabetes mellitus type 1 ([facility] [address]     Headache(784.0)     Hypothyroidism        Past Surgical History:  No past surgical history on file. Social History:    Social History     Tobacco Use    Smoking status: Former Smoker    Smokeless tobacco: Never Used    Tobacco comment: uses electronic cigarette   Substance Use Topics    Alcohol use: Not on file                                Counseling given: Not Answered  Comment: uses electronic cigarette      Vital Signs (Current):   Vitals:    12/29/20 0657   BP: 112/61   Pulse: 72   Resp: 20   Temp: 97.7 °F (36.5 °C)   TempSrc: Temporal   SpO2: 98%   Weight: 195 lb (88.5 kg)   Height: 5' 7.5\" (1.715 m)                                              BP Readings from Last 3 Encounters:   12/29/20 112/61   12/21/20 112/72   09/18/20 115/69       NPO Status: Time of last liquid consumption: 0000                        Time of last solid consumption: 0000                        Date of last liquid consumption: 12/29/20                        Date of last solid food consumption: 12/29/20    BMI:   Wt Readings from Last 3 Encounters:   12/29/20 195 lb (88.5 kg)   12/21/20 198 lb 3.2 oz (89.9 kg)   12/15/20 201 lb (91.2 kg)     Body mass index is 30.09 kg/m².     CBC:   Lab Results   Component Value Date    WBC 7.8 12/21/2020    RBC 4.77 12/21/2020    HGB 13.1 12/21/2020    HCT 40.1 12/21/2020    MCV 84.1 12/21/2020    RDW 14.4 12/21/2020     12/21/2020       CMP:   Lab Results   Component Value Date     12/21/2020    K 4.3 12/21/2020     12/21/2020    CO2 24 12/21/2020    BUN 17 12/21/2020    CREATININE 0.70 12/21/2020    GFRAA >60.0 12/21/2020    LABGLOM >60.0 12/21/2020    GLUCOSE 196 12/21/2020    PROT 6.1 01/20/2017    CALCIUM 9.1 12/21/2020    BILITOT 1.0 01/20/2017    ALKPHOS 91 01/20/2017    AST 15 01/20/2017    ALT 13 01/20/2017       POC Tests: No results for input(s): POCGLU, POCNA, POCK, POCCL, POCBUN, POCHEMO, POCHCT in the last 72 hours. Coags: No results found for: PROTIME, INR, APTT    HCG (If Applicable):   Lab Results   Component Value Date    PREGTESTUR Negative 12/21/2020        ABGs: No results found for: PHART, PO2ART, JBI4XCU, XJQ6JVN, BEART, U8OZENQS     Type & Screen (If Applicable):  No results found for: LABABO, LABRH    Drug/Infectious Status (If Applicable):  No results found for: HIV, HEPCAB    COVID-19 Screening (If Applicable):   Lab Results   Component Value Date    COVID19 Not Detected 12/21/2020         Anesthesia Evaluation  Patient summary reviewed and Nursing notes reviewed no history of anesthetic complications:   Airway: Mallampati: II  TM distance: >3 FB   Neck ROM: full  Mouth opening: > = 3 FB Dental: normal exam         Pulmonary:Negative Pulmonary ROS and normal exam                               Cardiovascular:Negative CV ROS  Exercise tolerance: good (>4 METS),            Beta Blocker:  Not on Beta Blocker         Neuro/Psych:   Negative Neuro/Psych ROS              GI/Hepatic/Renal: Neg GI/Hepatic/Renal ROS  (+) renal disease: no interval change,           Endo/Other: Negative Endo/Other ROS   (+) DiabetesType I DM, using insulin, hypothyroidism::., .          Pt had PAT visit.        Abdominal:           Vascular: negative vascular ROS.                                       Anesthesia Plan      MAC and Odin block     ASA 2       Induction: intravenous. MIPS: Prophylactic antiemetics administered. Anesthetic plan and risks discussed with patient. Plan discussed with CRNA.     Attending anesthesiologist reviewed and agrees with Pre Eval content              Shelia Christine MD   12/29/2020

## 2020-12-29 NOTE — ANESTHESIA POSTPROCEDURE EVALUATION
Department of Anesthesiology  Postprocedure Note    Patient: Everett Jaquez  MRN: [id number]  YOB: 1990  Date of evaluation: 12/29/2020  Time:  9:11 AM     Procedure Summary     Date: 12/29/20 Room / Location: [address] [place] [address]    Anesthesia Start: 6404 Anesthesia Stop: 5157    Procedure: RIGHT HAND CARPAL TUNNEL DECOMPRESSION (Right Wrist) Diagnosis: (RIGHT HAND CARPAL TUNNEL SYNDROME)    Surgeons: Michael Omer MD Responsible Provider: Lay Calix MD    Anesthesia Type: MAC, [place] block ASA Status: 2          Anesthesia Type: MAC, Donis block    Suad Phase I: Suad Score: 10    Suad Phase II:      Last vitals: Reviewed and per EMR flowsheets.        Anesthesia Post Evaluation    Patient location during evaluation: PACU  Patient participation: complete - patient participated  Level of consciousness: awake  Pain score: 0  Airway patency: patent  Nausea & Vomiting: no nausea and no vomiting  Complications: no  Cardiovascular status: blood pressure returned to baseline and hemodynamically stable  Respiratory status: acceptable  Hydration status: euvolemic

## 2021-01-12 ENCOUNTER — OFFICE VISIT (OUTPATIENT)
Dept: ORTHOPEDIC SURGERY | Age: 31
End: 2021-01-12
Payer: COMMERCIAL

## 2021-01-12 VITALS
BODY MASS INDEX: 28.79 KG/M2 | HEART RATE: 97 BPM | OXYGEN SATURATION: 97 % | TEMPERATURE: 97.2 F | WEIGHT: 190 LBS | HEIGHT: 68 IN

## 2021-01-12 DIAGNOSIS — G56.02 CARPAL TUNNEL SYNDROME, LEFT: Primary | ICD-10-CM

## 2021-01-12 PROCEDURE — 99213 OFFICE O/P EST LOW 20 MIN: CPT | Performed by: ORTHOPAEDIC SURGERY

## 2021-01-12 NOTE — PROGRESS NOTES
Subjective:      Patient ID: Glenna So is a 26 y.o. female who presents today for:  Chief Complaint   Patient presents with    Post-Op Check     pt had carpal tunnel surgery 12/29/ stitch removal/ painis a 2/10     Patient has been having symptoms of tingling, paresthesia and numbness in the left hand  She would like to have the surgical intervention for the left hand as well  HPI  Symptoms have been present for many months  He appears to have good outcome following the decompression for the right hand carpal tunnel syndrome    Past Medical History:   Diagnosis Date    Diabetes mellitus type 1 ([address]     Headache(784.0)     Hypothyroidism      Past Surgical History:   Procedure Laterality Date    CARPAL TUNNEL RELEASE Right 12/29/2020    RIGHT HAND CARPAL TUNNEL DECOMPRESSION performed by Anuj Green MD at [address] [place] [address] History     Socioeconomic History    Marital status:      Spouse name: Not on file    Number of children: Not on file    Years of education: Not on file    Highest education level: Not on file   Occupational History    Not on file   Social Needs    Financial resource strain: Not on file    Food insecurity     Worry: Not on file     Inability: Not on file    Transportation needs     Medical: Not on file     Non-medical: Not on file   Tobacco Use    Smoking status: Former Smoker    Smokeless tobacco: Never Used    Tobacco comment: uses electronic cigarette   Substance and Sexual Activity    Alcohol use: Not on file    Drug use: Not on file    Sexual activity: Not on file   Lifestyle    Physical activity     Days per week: Not on file     Minutes per session: Not on file    Stress: Not on file   Relationships    Social connections     Talks on phone: Not on file     Gets together: Not on file     Attends Church service: Not on file     Active member of club or organization: Not on file     Attends meetings of clubs or organizations: Not on file flowsheet data found. Review of Systems    Objective:   Pulse 97   Temp 97.2 °F (36.2 °C)   Ht 5' 7.5\" (1.715 m)   Wt 190 lb (86.2 kg)   SpO2 97%   BMI 29.32 kg/m²     Physical Exam:  Left hand  There is no swelling, or wasting of the small muscles in the hand   she describes sensation to diminished both in the thumb index middle finger and in the little finger    Mild discomfort over the carpal canal area  Tinel sign is negative  Phalen sign is positive  Wrist motion is largely pain-free     Right hand   the surgical site has healed up well  He describes the numbness has significantly reduced   there is no redness or erythema  Good flexion extension of all the fingers are noted            Diagnostic Imaging:     CLINICAL INTERPRETATION:  EMG studies are showing severe bilateral  median nerve compression neuropathy at the wrists consistent with a  diagnosis of severe bilateral carpal tunnel syndrome.     Due to continued symptoms, the patient shall need decompression of the  median nerves.       Assessment:       Diagnosis Orders   1. Carpal tunnel syndrome, left           Plan:   She has done well with the right hand carpal tunnel decompression and wishes to have the same procedure in the left hand  The risks and benefits of the surgical procedure have been discussed  Surgical procedure was explained in detail, the risks and benefits of surgery were also discussed  Risk of anesthesia which is a Biers block, risk of injury to the vessel, nerve, tendon.   Postoperative complications such as hematoma formation, infection, DVT, continued pain in the hand, neuroma formation, loss of sensations in the finger, wound healing problems, possible need for second surgery, recurrence of the symptoms, complications from her medical condition have all been discussed  Patient understands all the risks and benefits and consents for the surgical procedure    No orders of the defined types were placed in this encounter. No orders of the defined types were placed in this encounter. Return Plan for surgery in the left hand. Aziza Dyer MD        Surgery Phone: [phone]   [facility]   Surgery Fax: [phone]    Phone: [phone]   PAT Fax: [phone]    Fax: [phone]    Orthopedics: Surgery Scheduling, PAT & PRE-OP Order Form  Call to advance [place] at [phone] at least 24 hours prior to date of service     Surgery Location: [facility] Surgery: [address] [place] [address] Lissa Bowen MD Surgery Date:   Time: am   Patient's Name: Jacki Bass : 1990    Gender: female  Home Phone:  [phone] Cell Phone: [phone]  Emergency Contact:  [address] [place] [address] 18   Phone: [phone]  Payor: Babar Albrecht /  /  /    ID No.: [id number]      PROVIDER TO COMPLETE:  Diagnosis: Left hand carpal tunnel syndrome  Procedure/Consent: Left hand carpal tunnel decompression  Case Comments/Implants: N/A   Surgery Scheduled as:  Outpatient  Anesthesia Requested: [facility]  Referring Family Doctor: DO JAMI Vick  [x] [facility] Date/Time:                                                            [x] History & Physical [] Physician will Provide [] Attached [] Dictated [] Other  [x] Follow Anesthesia Pre-Op Orders for X-rays, [facility]     [x] SN & PT to evaluate and treat/educate disease management, medications, home safety & equipment needs for total joint patients  [] Other: ____________________________________________________  Consults: Medical/Cardiac Clearance done by  ____________________  PRE-OP ORDERS:   Allergies: Morphine and Pcn [penicillins] Latex Allergies:             Diabetic:           [] IV ________________________  [x] IV Start with J-loop     Preprinted Orders: Attached [] Yes [] No   ANTIBIOTIC PRE-OP: [x] ANCEF 2 gram IVPB if > 120 kg 3 grams IVPB within 1 hour of incision, if ALLERGIC, use VANCOMYCIN 1 gram IV, 2 hours pre-op  [] TXA Protocol [] Other:   [x] NPO   [] Betablocker (if needed) _____________________________________   [] Knee high anti-embolic hose [] Thigh high anti-embolic hose   Other: ______________________________________________________    Physician Signature Required:    Date/Time: 1/12/2021

## 2021-01-13 ENCOUNTER — TELEPHONE (OUTPATIENT)
Dept: ORTHOPEDIC SURGERY | Age: 31
End: 2021-01-13

## 2021-01-13 NOTE — TELEPHONE ENCOUNTER
Patient was in the office yesterday 01/12 and had her sutures removed for left carpal release. She was released to go back to work today with no restrictions at her own request. She says that she was at work today and her post op incision opened and started bleeding. 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 I did let her know that she needed to keep this covered. Please advise.

## 2021-01-15 ENCOUNTER — OFFICE VISIT (OUTPATIENT)
Dept: ORTHOPEDIC SURGERY | Age: 31
End: 2021-01-15

## 2021-01-15 ENCOUNTER — TELEPHONE (OUTPATIENT)
Dept: ORTHOPEDIC SURGERY | Age: 31
End: 2021-01-15

## 2021-01-15 VITALS
BODY MASS INDEX: 28.79 KG/M2 | HEIGHT: 68 IN | WEIGHT: 190 LBS | TEMPERATURE: 95.4 F | HEART RATE: 90 BPM | OXYGEN SATURATION: 99 %

## 2021-01-15 DIAGNOSIS — T81.30XA WOUND DEHISCENCE: Primary | ICD-10-CM

## 2021-01-15 PROCEDURE — 99024 POSTOP FOLLOW-UP VISIT: CPT | Performed by: PHYSICIAN ASSISTANT

## 2021-01-15 NOTE — PROGRESS NOTES
[facility]      Subjective:      Chief Complaint   Patient presents with    Post-Op Check     pt here post op for right carpal tunnel release, pt states after getting her stiches out she went back to work the next day and her hand soit open half way. pt states she has pain of 8/10. HPI: Harmon Sicard is a 26 y.o. female who is here about 3 weeks postop carpal tunnel release, now with wound asses there is dehisence . Is a diabetic. She is not wearing any bracing to cover her incision, she works on the [employer]. There is no discharge from here. There is no erythema. Incision at hardy aspect split about 1 mm distally.     Past Medical History:   Diagnosis Date    Diabetes mellitus type 1 ([address]     Headache(784.0)     Hypothyroidism       Past Surgical History:   Procedure Laterality Date    CARPAL TUNNEL RELEASE Right 12/29/2020    RIGHT HAND CARPAL TUNNEL DECOMPRESSION performed by Shahrzad Calles MD at [address] [place] [address] Marital status:      Spouse name: Not on file    Number of children: Not on file    Years of education: Not on file    Highest education level: Not on file   Occupational History    Not on file   Social Needs    Financial resource strain: Not on file    Food insecurity     Worry: Not on file     Inability: Not on file    Transportation needs     Medical: Not on file     Non-medical: Not on file   Tobacco Use    Smoking status: Former Smoker    Smokeless tobacco: Never Used    Tobacco comment: uses electronic cigarette   Substance and Sexual Activity    Alcohol use: Not on file    Drug use: Not on file    Sexual activity: Not on file   Lifestyle    Physical activity     Days per week: Not on file     Minutes per session: Not on file    Stress: Not on file   Relationships    Social connections     Talks on phone: Not on file     Gets together: Not on file     Attends Mandaeism service: Not on file     Active member of club or organization: Not on file     Attends meetings of clubs or organizations: Not on file     Relationship status: Not on file    Intimate partner violence     Fear of current or ex partner: Not on file     Emotionally abused: Not on file     Physically abused: Not on file     Forced sexual activity: Not on file   Other Topics Concern    Not on file   Social History Narrative    Not on file     Family History   Problem Relation Age of Onset    Cancer Mother     High Blood Pressure Mother     Diabetes Sister     Heart Disease Sister      Allergies   Allergen Reactions    Morphine Hives    Pcn [Penicillins] Hives     Current Outpatient Medications on File Prior to Visit   Medication Sig Dispense Refill    furosemide (LASIX) 40 MG tablet TAKE 1 TABLET BY MOUTH EVERY DAY 30 tablet 3    spironolactone (ALDACTONE) 50 MG tablet One daily 30 tablet 3    atorvastatin (LIPITOR) 20 MG tablet TAKE 1 TABLET BY MOUTH EVERY DAY 90 tablet 1    lisinopril (PRINIVIL;ZESTRIL) 10 MG tablet Take 1 tablet by mouth daily 30 tablet 3    levothyroxine (SYNTHROID) 50 MCG tablet Take 1 tablet by mouth daily 30 tablet 3    potassium chloride (KLOR-CON 10) 10 MEQ extended release tablet TAKE 1 TABLET BY MOUTH EVERY DAY 30 tablet 3    insulin lispro (HUMALOG KWIKPEN) 100 UNIT/ML pen 10 units at each meals 30 mL 0    insulin regular (NOVOLIN R) 100 UNIT/ML injection 10 units with each meals 1 vial 3    blood glucose test strips (FREESTYLE LITE) strip 1 each by In Vitro route 4 times daily 150 each 0    DRUG MART UNIFINE PENTIPS 32G X 4 MM MISC use FOUR TIMES DAILY 300 each 3    Blood Glucose Monitoring Suppl (FREESTYLE LITE) RAMYA Please give 1 meter dx 250.01 1 Device 0    FREESTYLE LANCETS MISC Pt test 4x daily dx 250.01 150 each 6    Alcohol Swabs (ALCOHOL WIPES) PADS As directed 100 each 06    Insulin Pen Needle (NOVOFINE) 32G X 6 MM MISC Qid 300 each 3     No current facility-administered medications on file prior to visit. Objective:   Pulse 90   Temp 95.4 °F (35.2 °C) (Temporal)   Ht 5' 7.5\" (1.715 m)   Wt 190 lb (86.2 kg)   SpO2 99%   BMI 29.32 kg/m²     General: WDWN, well appearing, in no distress   Skin: without breakdown, rash, normal in color    Ortho Exam  Distal wound dehiscence at the palmar aspect of the right hand. No signs of infection, no erythema or discharge. Radiographs and Laboratory Studies:   Previous diagnostic imaging studies were reviewed. Laboratory Studies:   Lab Results   Component Value Date    WBC 7.8 12/21/2020    HGB 13.1 12/21/2020    HCT 40.1 12/21/2020    MCV 84.1 12/21/2020     12/21/2020     No results found for: Piyush Phelps  No results found for: CRP    Assessment and Plan:      Diagnosis Orders   1. Wound dehiscence       3 weeks postop carpal tunnel release on the right side. She is right-hand dominant. She has been working on the [employer] without any kind of bracing to the incision site. She is exposed to high amounts of trauma each time that she works. This unfortunately has resulted into the distal end of her incision to open. There is no erythema or discharge. No concern for infection at this time however she is diabetic. She was instructed to put antibacterial ointment on her incision at night and cover with a Band-Aid. Otherwise let it air dry throughout the weekend. He is instructed to take a week off work to let the wound heal.  In the event that it becomes more erythematous or has discharge she was told to call our office and we will send her antibiotic       The above plan was discussed in thorough detail with Dr. Marizol Pearce and the patient. No orders of the defined types were placed in this encounter. No orders of the defined types were placed in this encounter. No follow-ups on file.     Kia Valadez PA-C  [facility]  [phone]

## 2021-03-08 RX ORDER — LEVOTHYROXINE SODIUM 0.05 MG/1
TABLET ORAL
Qty: 90 TABLET | Refills: 1 | Status: SHIPPED | OUTPATIENT
Start: 2021-03-08 | End: 2021-09-07

## 2021-04-26 RX ORDER — FUROSEMIDE 40 MG/1
TABLET ORAL
Qty: 30 TABLET | Refills: 3 | Status: SHIPPED | OUTPATIENT
Start: 2021-04-26 | End: 2021-07-02

## 2021-05-05 RX ORDER — SPIRONOLACTONE 50 MG/1
TABLET, FILM COATED ORAL
Qty: 30 TABLET | Refills: 3 | Status: SHIPPED | OUTPATIENT
Start: 2021-05-05 | End: 2021-08-25

## 2021-05-18 ENCOUNTER — TELEPHONE (OUTPATIENT)
Dept: ENDOCRINOLOGY | Age: 31
End: 2021-05-18

## 2021-06-10 RX ORDER — POTASSIUM CHLORIDE 750 MG/1
TABLET, FILM COATED, EXTENDED RELEASE ORAL
Qty: 90 TABLET | Refills: 1 | Status: SHIPPED | OUTPATIENT
Start: 2021-06-10 | End: 2021-12-15

## 2021-06-23 RX ORDER — ATORVASTATIN CALCIUM 20 MG/1
TABLET, FILM COATED ORAL
Qty: 90 TABLET | Refills: 1 | Status: SHIPPED | OUTPATIENT
Start: 2021-06-23 | End: 2021-12-13

## 2021-07-01 ENCOUNTER — PATIENT MESSAGE (OUTPATIENT)
Dept: ENDOCRINOLOGY | Age: 31
End: 2021-07-01

## 2021-07-02 RX ORDER — FUROSEMIDE 40 MG/1
TABLET ORAL
Qty: 90 TABLET | Refills: 3
Start: 2021-07-02 | End: 2021-08-18

## 2021-08-18 RX ORDER — FUROSEMIDE 40 MG/1
TABLET ORAL
Qty: 30 TABLET | Refills: 0 | Status: SHIPPED | OUTPATIENT
Start: 2021-08-18 | End: 2021-09-16

## 2021-08-25 RX ORDER — SPIRONOLACTONE 50 MG/1
TABLET, FILM COATED ORAL
Qty: 30 TABLET | Refills: 0 | Status: SHIPPED | OUTPATIENT
Start: 2021-08-25 | End: 2021-10-04 | Stop reason: SDUPTHER

## 2021-09-08 RX ORDER — LEVOTHYROXINE SODIUM 0.05 MG/1
TABLET ORAL
Qty: 90 TABLET | Refills: 0 | Status: SHIPPED | OUTPATIENT
Start: 2021-09-08 | End: 2021-12-11

## 2021-09-13 RX ORDER — FUROSEMIDE 40 MG/1
TABLET ORAL
Qty: 30 TABLET | Refills: 0 | OUTPATIENT
Start: 2021-09-13

## 2021-09-16 RX ORDER — FUROSEMIDE 40 MG/1
TABLET ORAL
Qty: 30 TABLET | Refills: 0 | Status: SHIPPED | OUTPATIENT
Start: 2021-09-16 | End: 2021-10-12

## 2021-09-24 ENCOUNTER — OFFICE VISIT (OUTPATIENT)
Dept: ENDOCRINOLOGY | Age: 31
End: 2021-09-24
Payer: COMMERCIAL

## 2021-09-24 VITALS
SYSTOLIC BLOOD PRESSURE: 95 MMHG | HEART RATE: 78 BPM | OXYGEN SATURATION: 96 % | WEIGHT: 203 LBS | BODY MASS INDEX: 30.77 KG/M2 | HEIGHT: 68 IN | DIASTOLIC BLOOD PRESSURE: 66 MMHG

## 2021-09-24 DIAGNOSIS — E03.9 HYPOTHYROIDISM, UNSPECIFIED TYPE: Primary | ICD-10-CM

## 2021-09-24 LAB
CHP ED QC CHECK: NORMAL
GLUCOSE BLD-MCNC: 121 MG/DL
HBA1C MFR BLD: 7.1 %

## 2021-09-24 PROCEDURE — 83036 HEMOGLOBIN GLYCOSYLATED A1C: CPT | Performed by: INTERNAL MEDICINE

## 2021-09-24 PROCEDURE — 99213 OFFICE O/P EST LOW 20 MIN: CPT | Performed by: INTERNAL MEDICINE

## 2021-09-24 PROCEDURE — 3051F HG A1C>EQUAL 7.0%<8.0%: CPT | Performed by: INTERNAL MEDICINE

## 2021-09-24 PROCEDURE — 82962 GLUCOSE BLOOD TEST: CPT | Performed by: INTERNAL MEDICINE

## 2021-09-24 NOTE — PROGRESS NOTES
9/24/2021    Assessment:       Diagnosis Orders   1. Hypothyroidism, unspecified type  TSH with Reflex    T4, Free   2.  DM w/ coma type I, uncontrolled ([facility]           PLAN:     Orders Placed This Encounter   Procedures    Lipid, Fasting     Standing Status:   Future     Standing Expiration Date:   9/24/2022    Hemoglobin A1C     Standing Status:   Future     Standing Expiration Date:   9/24/2022    Basic Metabolic Panel, Fasting     Standing Status:   Future     Standing Expiration Date:   9/24/2022    TSH with Reflex     Standing Status:   Future     Standing Expiration Date:   9/24/2022    T4, Free     Standing Status:   Future     Standing Expiration Date:   9/24/2022    POCT Glucose    POCT glycosylated hemoglobin (Hb A1C)     DIABETES FOOT EXAM     Continue regular insulin 10 units with each meals continue Synthroid 50 mcg daily follow-up in 3 to 6 months     Medications Discontinued During This Encounter   Medication Reason    Blood Glucose Monitoring Suppl (FREESTYLE LITE) RAMYA LIST CLEANUP    blood glucose test strips (FREESTYLE LITE) strip LIST CLEANUP    FREESTYLE LANCETS MISC LIST CLEANUP    lisinopril (PRINIVIL;ZESTRIL) 10 MG tablet LIST CLEANUP         Orders Placed This Encounter   Procedures    POCT Glucose    POCT glycosylated hemoglobin (Hb A1C)       Subjective:     Chief Complaint   Patient presents with    Diabetes    Hypothyroidism     Vitals:    09/24/21 1614   BP: 95/66   Pulse: 78   SpO2: 96%   Weight: 203 lb (92.1 kg)   Height: 5' 7.5\" (1.715 m)     Wt Readings from Last 3 Encounters:   09/24/21 203 lb (92.1 kg)   01/15/21 190 lb (86.2 kg)   01/12/21 190 lb (86.2 kg)     BP Readings from Last 3 Encounters:   09/24/21 95/66   12/29/20 (!) 96/54   12/29/20 99/68     Follow-up on type 1 diabetes patient only taking regular insulin 10 units with each meals through [facility] history of hypothyroidism on replacement with Synthroid 50 mcg daily no recent labs to review  Testing only periodically compliance variable with diet and testing  Hemoglobin A1c was 7.1 averaging sugars around 150  Thyroid function tests were stable from last year  Patient also not taking lisinopril due to lower blood pressure does take diuretics for edema    Diabetes  She presents for her follow-up diabetic visit. She has type 1 diabetes mellitus. Current diabetic treatment includes insulin injections. She is currently taking insulin pre-breakfast, pre-lunch and pre-dinner. Her overall blood glucose range is 140-180 mg/dl. (Lab Results       Component                Value               Date                       LABA1C                   7.1                 09/24/2021              )     Past Medical History:   Diagnosis Date    Diabetes mellitus type 1 ([address]     Headache(784.0)     Hypothyroidism      Past Surgical History:   Procedure Laterality Date    CARPAL TUNNEL RELEASE Right 12/29/2020    RIGHT HAND CARPAL TUNNEL DECOMPRESSION performed by Annie Leon MD at [address] [place] [address] History     Socioeconomic History    Marital status:      Spouse name: Not on file    Number of children: Not on file    Years of education: Not on file    Highest education level: Not on file   Occupational History    Not on file   Tobacco Use    Smoking status: Former Smoker    Smokeless tobacco: Never Used    Tobacco comment: uses electronic cigarette   Substance and Sexual Activity    Alcohol use: Not on file    Drug use: Not on file    Sexual activity: Not on file   Other Topics Concern    Not on file   Social History Narrative    Not on file     Social Determinants of Health     Financial Resource Strain:     Difficulty of Paying Living Expenses:    Food Insecurity:     Worried About [address] in the Last Year:     [address] in the Last Year:    Transportation Needs:     Lack of Transportation (Medical):      Lack of Transportation (Non-Medical):    Physical Activity:     Days of Exercise per Week:     Minutes of Exercise per Session:    Stress:     Feeling of Stress :    Social Connections:     Frequency of Communication with Friends and Family:     Frequency of Social Gatherings with Friends and Family:     Attends Sabianist Services:     Active Member of Clubs or Organizations:     Attends Club or Organization Meetings:     Marital Status:    Intimate Partner Violence:     Fear of Current or Ex-Partner:     Emotionally Abused:     Physically Abused:     Sexually Abused:      Family History   Problem Relation Age of Onset    Cancer Mother     High Blood Pressure Mother     Diabetes Sister     Heart Disease Sister      Allergies   Allergen Reactions    Morphine Hives    Pcn [Penicillins] Hives       Current Outpatient Medications:     furosemide (LASIX) 40 MG tablet, TAKE 1 TABLET BY MOUTH EVERY DAY, Disp: 30 tablet, Rfl: 0    levothyroxine (SYNTHROID) 50 MCG tablet, TAKE 1 TABLET BY MOUTH EVERY DAY, Disp: 90 tablet, Rfl: 0    spironolactone (ALDACTONE) 50 MG tablet, TAKE 1 TABLET BY MOUTH EVERY DAY, Disp: 30 tablet, Rfl: 0    atorvastatin (LIPITOR) 20 MG tablet, TAKE 1 TABLET BY MOUTH EVERY DAY, Disp: 90 tablet, Rfl: 1    potassium chloride (KLOR-CON) 10 MEQ extended release tablet, TAKE 1 TABLET BY MOUTH EVERY DAY, Disp: 90 tablet, Rfl: 1    lisinopril (PRINIVIL;ZESTRIL) 10 MG tablet, Take 1 tablet by mouth daily, Disp: 30 tablet, Rfl: 3    insulin lispro (HUMALOG KWIKPEN) 100 UNIT/ML pen, 10 units at each meals, Disp: 30 mL, Rfl: 0    insulin regular (NOVOLIN R) 100 UNIT/ML injection, 10 units with each meals, Disp: 1 vial, Rfl: 3    DRUG MART UNIFINE PENTIPS 32G X 4 MM MISC, use FOUR TIMES DAILY, Disp: 300 each, Rfl: 3    Alcohol Swabs (ALCOHOL WIPES) PADS, As directed, Disp: 100 each, Rfl: 06    Insulin Pen Needle (NOVOFINE) 32G X 6 MM MISC, Qid, Disp: 300 each, Rfl: 3  Lab Results   Component Value Date     12/21/2020    K 4.3 12/21/2020     12/21/2020    CO2 24 12/21/2020    BUN 17 12/21/2020    CREATININE 0.70 12/21/2020    GLUCOSE 121 09/24/2021    CALCIUM 9.1 12/21/2020    PROT 6.1 (L) 01/20/2017    LABALBU 4.3 01/20/2017    BILITOT 1.0 01/20/2017    ALKPHOS 91 01/20/2017    AST 15 01/20/2017    ALT 13 01/20/2017    LABGLOM >60.0 12/21/2020    GFRAA >60.0 12/21/2020     Lab Results   Component Value Date    WBC 7.8 12/21/2020    HGB 13.1 12/21/2020    HCT 40.1 12/21/2020    MCV 84.1 12/21/2020     12/21/2020     Lab Results   Component Value Date    LABA1C 7.1 09/24/2021    LABA1C 6.5 09/18/2020    LABA1C 5.9 06/20/2019     Lab Results   Component Value Date    HDL 62 (H) 09/18/2020    HDL 46 01/20/2017    LDLCALC 28 09/18/2020    LDLCALC 7 01/20/2017    CHOL 106 09/18/2020    CHOL 96 01/20/2017    TRIG 80 09/18/2020    TRIG 217 (H) 01/20/2017     No results found for: TESTM  Lab Results   Component Value Date    TSH 0.965 09/18/2020    TSH 3.080 06/20/2019    TSH 1.410 07/09/2018    T4FREE 1.30 09/18/2020    T4FREE 1.39 06/20/2019    T4FREE 1.32 07/09/2018     No results found for: TPOABS    Review of Systems   Eyes: Negative. Cardiovascular: Negative. Endocrine: Negative. All other systems reviewed and are negative. Objective:   Physical Exam  Vitals reviewed. Constitutional:       Appearance: Normal appearance. She is obese. HENT:      Head: Normocephalic and atraumatic. Hair is normal.      Right Ear: External ear normal.      Left Ear: External ear normal.      Nose: Nose normal.   Eyes:      General: No scleral icterus. Right eye: No discharge. Left eye: No discharge. Extraocular Movements: Extraocular movements intact. Conjunctiva/sclera: Conjunctivae normal.   Neck:      Trachea: Trachea normal.   Cardiovascular:      Rate and Rhythm: Normal rate. Pulmonary:      Effort: Pulmonary effort is normal.   Musculoskeletal:         General: Normal range of motion.       Cervical back: Normal range of motion and neck supple. Feet:    Neurological:      General: No focal deficit present. Mental Status: She is alert and oriented to person, place, and time.    Psychiatric:         Mood and Affect: Mood normal.         Behavior: Behavior normal.

## 2021-10-03 ASSESSMENT — ENCOUNTER SYMPTOMS: EYES NEGATIVE: 1

## 2021-10-04 RX ORDER — SPIRONOLACTONE 50 MG/1
TABLET, FILM COATED ORAL
Qty: 30 TABLET | Refills: 0 | Status: SHIPPED | OUTPATIENT
Start: 2021-10-04 | End: 2021-10-08 | Stop reason: SDUPTHER

## 2021-10-11 RX ORDER — SPIRONOLACTONE 50 MG/1
TABLET, FILM COATED ORAL
Qty: 30 TABLET | Refills: 3 | Status: SHIPPED | OUTPATIENT
Start: 2021-10-11 | End: 2022-01-06

## 2021-10-12 RX ORDER — FUROSEMIDE 40 MG/1
TABLET ORAL
Qty: 30 TABLET | Refills: 3 | Status: SHIPPED | OUTPATIENT
Start: 2021-10-12 | End: 2022-02-11 | Stop reason: SDUPTHER

## 2021-11-17 ENCOUNTER — TELEPHONE (OUTPATIENT)
Dept: ENDOCRINOLOGY | Age: 31
End: 2021-11-17

## 2021-11-17 NOTE — TELEPHONE ENCOUNTER
MED called to request last office note for patient. Information printed and faxed to [address] [place] [address] at [phone].

## 2021-12-11 RX ORDER — LEVOTHYROXINE SODIUM 0.05 MG/1
TABLET ORAL
Qty: 90 TABLET | Refills: 1 | Status: SHIPPED | OUTPATIENT
Start: 2021-12-11 | End: 2022-06-17 | Stop reason: SDUPTHER

## 2021-12-13 RX ORDER — ATORVASTATIN CALCIUM 20 MG/1
TABLET, FILM COATED ORAL
Qty: 90 TABLET | Refills: 1 | Status: SHIPPED | OUTPATIENT
Start: 2021-12-13 | End: 2022-06-17 | Stop reason: SDUPTHER

## 2021-12-15 RX ORDER — POTASSIUM CHLORIDE 750 MG/1
TABLET, FILM COATED, EXTENDED RELEASE ORAL
Qty: 90 TABLET | Refills: 1 | Status: SHIPPED | OUTPATIENT
Start: 2021-12-15 | End: 2022-06-17 | Stop reason: SDUPTHER

## 2022-01-06 RX ORDER — SPIRONOLACTONE 50 MG/1
TABLET, FILM COATED ORAL
Qty: 90 TABLET | Refills: 1 | Status: SHIPPED | OUTPATIENT
Start: 2022-01-06 | End: 2022-07-11 | Stop reason: SDUPTHER

## 2022-02-11 RX ORDER — FUROSEMIDE 40 MG/1
TABLET ORAL
Qty: 30 TABLET | Refills: 3 | Status: SHIPPED | OUTPATIENT
Start: 2022-02-11 | End: 2022-05-13

## 2022-05-13 RX ORDER — FUROSEMIDE 40 MG/1
TABLET ORAL
Qty: 90 TABLET | Refills: 0 | Status: SHIPPED | OUTPATIENT
Start: 2022-05-13 | End: 2022-08-09 | Stop reason: SDUPTHER

## 2022-06-17 RX ORDER — ATORVASTATIN CALCIUM 20 MG/1
TABLET, FILM COATED ORAL
Qty: 90 TABLET | Refills: 0 | Status: SHIPPED | OUTPATIENT
Start: 2022-06-17 | End: 2022-09-21 | Stop reason: SDUPTHER

## 2022-06-17 RX ORDER — POTASSIUM CHLORIDE 750 MG/1
TABLET, FILM COATED, EXTENDED RELEASE ORAL
Qty: 90 TABLET | Refills: 0 | Status: SHIPPED | OUTPATIENT
Start: 2022-06-17 | End: 2022-09-21 | Stop reason: SDUPTHER

## 2022-06-17 RX ORDER — LEVOTHYROXINE SODIUM 0.05 MG/1
TABLET ORAL
Qty: 90 TABLET | Refills: 0 | Status: SHIPPED | OUTPATIENT
Start: 2022-06-17 | End: 2022-09-11 | Stop reason: SDUPTHER

## 2022-07-11 ENCOUNTER — PATIENT MESSAGE (OUTPATIENT)
Dept: ENDOCRINOLOGY | Age: 32
End: 2022-07-11

## 2022-07-11 NOTE — TELEPHONE ENCOUNTER
From: Rico Merrill  To: Dr. Felipe Cho  Sent: 7/11/2022 3:06 PM EDT  Subject: Refill    Hi this is Castro Narrow can you send a refill request for my spironolactone to [id number] [facility],[address] thank you

## 2022-07-12 RX ORDER — SPIRONOLACTONE 50 MG/1
TABLET, FILM COATED ORAL
Qty: 90 TABLET | Refills: 0 | Status: SHIPPED | OUTPATIENT
Start: 2022-07-12 | End: 2022-10-17 | Stop reason: SDUPTHER

## 2022-07-22 ENCOUNTER — OFFICE VISIT (OUTPATIENT)
Dept: ENDOCRINOLOGY | Age: 32
End: 2022-07-22
Payer: COMMERCIAL

## 2022-07-22 VITALS
HEIGHT: 67 IN | OXYGEN SATURATION: 96 % | DIASTOLIC BLOOD PRESSURE: 63 MMHG | WEIGHT: 206 LBS | HEART RATE: 78 BPM | BODY MASS INDEX: 32.33 KG/M2 | SYSTOLIC BLOOD PRESSURE: 99 MMHG

## 2022-07-22 DIAGNOSIS — E03.9 HYPOTHYROIDISM, UNSPECIFIED TYPE: Primary | ICD-10-CM

## 2022-07-22 LAB
CHP ED QC CHECK: NORMAL
GLUCOSE BLD-MCNC: 145 MG/DL
HBA1C MFR BLD: 7.1 %

## 2022-07-22 PROCEDURE — 99212 OFFICE O/P EST SF 10 MIN: CPT | Performed by: INTERNAL MEDICINE

## 2022-07-22 PROCEDURE — 83036 HEMOGLOBIN GLYCOSYLATED A1C: CPT | Performed by: INTERNAL MEDICINE

## 2022-07-22 PROCEDURE — 82962 GLUCOSE BLOOD TEST: CPT | Performed by: INTERNAL MEDICINE

## 2022-07-22 PROCEDURE — 3051F HG A1C>EQUAL 7.0%<8.0%: CPT | Performed by: INTERNAL MEDICINE

## 2022-07-22 NOTE — PROGRESS NOTES
7/22/2022    Assessment:       Diagnosis Orders   1. Hypothyroidism, unspecified type        2. DM w/ coma type I, uncontrolled (HCC)  POCT Glucose    POCT glycosylated hemoglobin (Hb A1C)            PLAN:     Orders Placed This Encounter   Procedures    Basic Metabolic Panel     Standing Status:   Future     Standing Expiration Date:   7/22/2023    TSH with Reflex     Standing Status:   Future     Standing Expiration Date:   7/22/2023    T4, Free     Standing Status:   Future     Standing Expiration Date:   7/22/2023    Hemoglobin A1C     Standing Status:   Future     Standing Expiration Date:   7/22/2023    POCT Glucose    POCT glycosylated hemoglobin (Hb A1C)     Continue current medication regimen for diabetes and thyroid patient to consider going on insulin pump for improved control when she is able to get insurance    Orders Placed This Encounter   Procedures    POCT Glucose    POCT glycosylated hemoglobin (Hb A1C)       Subjective:     Chief Complaint   Patient presents with    Hypothyroidism    Diabetes     Vitals:    07/22/22 1529   BP: 99/63   Site: Right Upper Arm   Position: Sitting   Cuff Size: Medium Adult   Pulse: 78   SpO2: 96%   Weight: 206 lb (93.4 kg)   Height: 5' 7\" (1.702 m)     Wt Readings from Last 3 Encounters:   07/22/22 206 lb (93.4 kg)   09/24/21 203 lb (92.1 kg)   01/15/21 190 lb (86.2 kg)     BP Readings from Last 3 Encounters:   07/22/22 99/63   09/24/21 95/66   12/29/20 (!) 96/54     Follow-up on type 1 diabetes hypothyroidism patient has been taking Novolin and Novolin R insulin also is on levothyroxine 50 mcg daily. Look at insulin pump therapy because of insurance hemoglobin A1c was 7.1 no recent chemistries to review    Diabetes  She presents for her follow-up diabetic visit. She has type 1 diabetes mellitus. Associated symptoms include fatigue. Pertinent negatives for diabetes include no polydipsia and no polyuria. Symptoms are stable.  Current diabetic treatment includes insulin injections. She is currently taking insulin pre-breakfast, pre-lunch, pre-dinner and at bedtime. Her overall blood glucose range is 130-140 mg/dl.  (Lab Results       Component                Value               Date                       LABA1C                   7.1                 07/22/2022              )   Past Medical History:   Diagnosis Date    Diabetes mellitus type 1 ([address]     Headache(784.0)     Hypothyroidism      Past Surgical History:   Procedure Laterality Date    CARPAL TUNNEL RELEASE Right 12/29/2020    RIGHT HAND CARPAL TUNNEL DECOMPRESSION performed by Eliot Simms MD at [address] [place] [address] History     Socioeconomic History    Marital status:      Spouse name: Not on file    Number of children: Not on file    Years of education: Not on file    Highest education level: Not on file   Occupational History    Not on file   Tobacco Use    Smoking status: Former    Smokeless tobacco: Never    Tobacco comments:     uses electronic cigarette   Substance and Sexual Activity    Alcohol use: Not on file    Drug use: Not on file    Sexual activity: Not on file   Other Topics Concern    Not on file   Social History Narrative    Not on file     Social Determinants of Health     Financial Resource Strain: Not on file   Food Insecurity: Not on file   Transportation Needs: Not on file   Physical Activity: Not on file   Stress: Not on file   Social Connections: Not on file   Intimate Partner Violence: Not on file   Housing Stability: Not on file     Family History   Problem Relation Age of Onset    Cancer Mother     High Blood Pressure Mother     Diabetes Sister     Heart Disease Sister      Allergies   Allergen Reactions    Morphine Hives    Pcn [Penicillins] Hives       Current Outpatient Medications:     spironolactone (ALDACTONE) 50 MG tablet, TAKE 1 TABLET BY MOUTH EVERY DAY, Disp: 90 tablet, Rfl: 0    levothyroxine (SYNTHROID) 50 MCG tablet, TAKE 1 TABLET BY MOUTH EVERY DAY, Disp: 90 tablet, Rfl: 0    atorvastatin (LIPITOR) 20 MG tablet, TAKE 1 TABLET BY MOUTH EVERY DAY, Disp: 90 tablet, Rfl: 0    potassium chloride (KLOR-CON) 10 MEQ extended release tablet, TAKE 1 TABLET BY MOUTH EVERY DAY, Disp: 90 tablet, Rfl: 0    furosemide (LASIX) 40 MG tablet, TAKE 1 TABLET BY MOUTH EVERY DAY, Disp: 90 tablet, Rfl: 0    insulin lispro (HUMALOG KWIKPEN) 100 UNIT/ML pen, 10 units at each meals, Disp: 30 mL, Rfl: 0    insulin regular (NOVOLIN R) 100 UNIT/ML injection, 10 units with each meals, Disp: 1 vial, Rfl: 3    [facility] UNIFINE PENTIPS 32G X 4 MM MISC, use FOUR TIMES DAILY, Disp: 300 each, Rfl: 3    Alcohol Swabs (ALCOHOL WIPES) PADS, As directed, Disp: 100 each, Rfl: 06    Insulin Pen Needle (NOVOFINE) 32G X 6 MM MISC, Qid, Disp: 300 each, Rfl: 3  Lab Results   Component Value Date     12/21/2020    K 4.3 12/21/2020     12/21/2020    CO2 24 12/21/2020    BUN 17 12/21/2020    CREATININE 0.70 12/21/2020    GLUCOSE 145 07/22/2022    CALCIUM 9.1 12/21/2020    PROT 6.1 (L) 01/20/2017    LABALBU 4.3 01/20/2017    BILITOT 1.0 01/20/2017    ALKPHOS 91 01/20/2017    AST 15 01/20/2017    ALT 13 01/20/2017    LABGLOM >60.0 12/21/2020    GFRAA >60.0 12/21/2020     Lab Results   Component Value Date    WBC 7.8 12/21/2020    HGB 13.1 12/21/2020    HCT 40.1 12/21/2020    MCV 84.1 12/21/2020     12/21/2020     Lab Results   Component Value Date    LABA1C 7.1 07/22/2022    LABA1C 7.1 09/24/2021    LABA1C 6.5 09/18/2020     Lab Results   Component Value Date    HDL 62 (H) 09/18/2020    HDL 46 01/20/2017    LDLCALC 28 09/18/2020    LDLCALC 7 01/20/2017    CHOL 106 09/18/2020    CHOL 96 01/20/2017    TRIG 80 09/18/2020    TRIG 217 (H) 01/20/2017     No results found for: TESTM  Lab Results   Component Value Date    TSH 0.965 09/18/2020    TSH 3.080 06/20/2019    TSH 1.410 07/09/2018    T4FREE 1.30 09/18/2020    T4FREE 1.39 06/20/2019    T4FREE 1.32 07/09/2018     No results found for: TPOABS    Review of

## 2022-08-09 RX ORDER — FUROSEMIDE 40 MG/1
TABLET ORAL
Qty: 90 TABLET | Refills: 3 | Status: SHIPPED | OUTPATIENT
Start: 2022-08-09

## 2022-09-12 RX ORDER — LEVOTHYROXINE SODIUM 0.05 MG/1
TABLET ORAL
Qty: 90 TABLET | Refills: 3 | Status: SHIPPED | OUTPATIENT
Start: 2022-09-12

## 2022-09-21 RX ORDER — ATORVASTATIN CALCIUM 20 MG/1
TABLET, FILM COATED ORAL
Qty: 90 TABLET | Refills: 0 | Status: SHIPPED | OUTPATIENT
Start: 2022-09-21 | End: 2022-10-10 | Stop reason: SDUPTHER

## 2022-09-21 RX ORDER — POTASSIUM CHLORIDE 750 MG/1
TABLET, FILM COATED, EXTENDED RELEASE ORAL
Qty: 90 TABLET | Refills: 0 | Status: SHIPPED | OUTPATIENT
Start: 2022-09-21

## 2022-10-10 RX ORDER — ATORVASTATIN CALCIUM 20 MG/1
TABLET, FILM COATED ORAL
Qty: 90 TABLET | Refills: 3 | Status: SHIPPED | OUTPATIENT
Start: 2022-10-10

## 2022-10-17 RX ORDER — SPIRONOLACTONE 50 MG/1
TABLET, FILM COATED ORAL
Qty: 90 TABLET | Refills: 3 | Status: SHIPPED | OUTPATIENT
Start: 2022-10-17

## 2022-12-19 RX ORDER — POTASSIUM CHLORIDE 750 MG/1
TABLET, FILM COATED, EXTENDED RELEASE ORAL
Qty: 90 TABLET | Refills: 3 | Status: SHIPPED | OUTPATIENT
Start: 2022-12-19

## 2022-12-19 RX ORDER — ATORVASTATIN CALCIUM 20 MG/1
TABLET, FILM COATED ORAL
Qty: 90 TABLET | Refills: 3 | Status: SHIPPED | OUTPATIENT
Start: 2022-12-19

## 2023-09-11 RX ORDER — LEVOTHYROXINE SODIUM 0.05 MG/1
TABLET ORAL
Qty: 30 TABLET | Refills: 0 | Status: SHIPPED | OUTPATIENT
Start: 2023-09-11 | End: 2023-09-14 | Stop reason: SDUPTHER

## 2023-09-11 RX ORDER — FUROSEMIDE 40 MG/1
TABLET ORAL
Qty: 90 TABLET | Refills: 3 | OUTPATIENT
Start: 2023-09-11

## 2023-09-11 RX ORDER — LEVOTHYROXINE SODIUM 0.05 MG/1
TABLET ORAL
Qty: 90 TABLET | Refills: 3 | OUTPATIENT
Start: 2023-09-11

## 2023-09-11 RX ORDER — FUROSEMIDE 40 MG/1
TABLET ORAL
Qty: 30 TABLET | Refills: 0 | Status: SHIPPED | OUTPATIENT
Start: 2023-09-11 | End: 2023-09-14 | Stop reason: SDUPTHER

## 2023-09-14 ENCOUNTER — OFFICE VISIT (OUTPATIENT)
Dept: ENDOCRINOLOGY | Age: 33
End: 2023-09-14
Payer: COMMERCIAL

## 2023-09-14 VITALS
BODY MASS INDEX: 33.74 KG/M2 | HEART RATE: 82 BPM | WEIGHT: 215 LBS | HEIGHT: 67 IN | SYSTOLIC BLOOD PRESSURE: 107 MMHG | OXYGEN SATURATION: 98 % | DIASTOLIC BLOOD PRESSURE: 74 MMHG

## 2023-09-14 DIAGNOSIS — E03.9 HYPOTHYROIDISM, UNSPECIFIED TYPE: ICD-10-CM

## 2023-09-14 DIAGNOSIS — E03.9 HYPOTHYROIDISM, UNSPECIFIED TYPE: Primary | ICD-10-CM

## 2023-09-14 DIAGNOSIS — E10.21 DIABETIC NEPHROPATHY ASSOCIATED WITH TYPE 1 DIABETES MELLITUS (HCC): ICD-10-CM

## 2023-09-14 LAB
ANION GAP SERPL CALCULATED.3IONS-SCNC: 14 MEQ/L (ref 9–15)
BUN SERPL-MCNC: 16 MG/DL (ref 6–20)
CALCIUM SERPL-MCNC: 9.2 MG/DL (ref 8.5–9.9)
CHLORIDE SERPL-SCNC: 104 MEQ/L (ref 95–107)
CHP ED QC CHECK: NORMAL
CO2 SERPL-SCNC: 24 MEQ/L (ref 20–31)
CREAT SERPL-MCNC: 0.66 MG/DL (ref 0.5–0.9)
CREAT UR-MCNC: 239.4 MG/DL
GLUCOSE BLD-MCNC: 93 MG/DL
GLUCOSE SERPL-MCNC: 83 MG/DL (ref 70–99)
HBA1C MFR BLD: 7.8 %
MICROALBUMIN UR-MCNC: <1.2 MG/DL
MICROALBUMIN/CREAT UR-RTO: NORMAL MG/G (ref 0–30)
POTASSIUM SERPL-SCNC: 3.9 MEQ/L (ref 3.4–4.9)
SODIUM SERPL-SCNC: 142 MEQ/L (ref 135–144)
T4 FREE SERPL-MCNC: 1.21 NG/DL (ref 0.84–1.68)
TSH SERPL-MCNC: 2.04 UIU/ML (ref 0.44–3.86)

## 2023-09-14 PROCEDURE — 3051F HG A1C>EQUAL 7.0%<8.0%: CPT | Performed by: INTERNAL MEDICINE

## 2023-09-14 PROCEDURE — 83036 HEMOGLOBIN GLYCOSYLATED A1C: CPT | Performed by: INTERNAL MEDICINE

## 2023-09-14 PROCEDURE — 99213 OFFICE O/P EST LOW 20 MIN: CPT | Performed by: INTERNAL MEDICINE

## 2023-09-14 PROCEDURE — 82962 GLUCOSE BLOOD TEST: CPT | Performed by: INTERNAL MEDICINE

## 2023-09-14 RX ORDER — LEVOTHYROXINE SODIUM 0.05 MG/1
50 TABLET ORAL DAILY
Qty: 30 TABLET | Refills: 5 | Status: SHIPPED | OUTPATIENT
Start: 2023-09-14

## 2023-09-14 RX ORDER — FUROSEMIDE 40 MG/1
40 TABLET ORAL DAILY
Qty: 30 TABLET | Refills: 5 | Status: SHIPPED | OUTPATIENT
Start: 2023-09-14

## 2023-09-14 NOTE — PROGRESS NOTES
9/14/2023    Assessment:       Diagnosis Orders   1. Hypothyroidism, unspecified type        2. Diabetic nephropathy associated with type 1 diabetes mellitus (HCC)  POCT Glucose    POCT glycosylated hemoglobin (Hb A1C)            PLAN:     Orders Placed This Encounter   Procedures    Basic Metabolic Panel     Standing Status:   Future     Number of Occurrences:   1     Standing Expiration Date:   9/14/2024    TSH     Standing Status:   Future     Number of Occurrences:   1     Standing Expiration Date:   9/14/2024    Microalbumin / Creatinine Urine Ratio     Standing Status:   Future     Number of Occurrences:   1     Standing Expiration Date:   9/14/2024    T4, Free     Standing Status:   Future     Number of Occurrences:   1     Standing Expiration Date:   9/14/2024    POCT Glucose    POCT glycosylated hemoglobin (Hb A1C)    HM DIABETES FOOT EXAM     Orders Placed This Encounter   Medications    levothyroxine (SYNTHROID) 50 MCG tablet     Sig: Take 1 tablet by mouth daily     Dispense:  30 tablet     Refill:  5    furosemide (LASIX) 40 MG tablet     Sig: Take 1 tablet by mouth daily     Dispense:  30 tablet     Refill:  5     Continue current insulin regimen continue current dose of levothyroxine and Lasix patient to follow-up in 2 to 3 months time A1c goal of 7 or lower    Orders Placed This Encounter   Procedures    POCT Glucose    POCT glycosylated hemoglobin (Hb A1C)     No orders of the defined types were placed in this encounter. No follow-ups on file.   Subjective:     Chief Complaint   Patient presents with    Hypothyroidism    Diabetes     Vitals:    09/14/23 1129   BP: 107/74   Pulse: 82   SpO2: 98%   Weight: 215 lb (97.5 kg)   Height: 5' 7\" (1.702 m)     Wt Readings from Last 3 Encounters:   09/14/23 215 lb (97.5 kg)   07/22/22 206 lb (93.4 kg)   09/24/21 203 lb (92.1 kg)     BP Readings from Last 3 Encounters:   09/14/23 107/74   07/22/22 99/63   09/24/21 95/66     Follow-up on type 1 diabetes

## 2023-09-20 ASSESSMENT — ENCOUNTER SYMPTOMS: EYES NEGATIVE: 1

## 2023-10-16 RX ORDER — LEVOTHYROXINE SODIUM 0.05 MG/1
50 TABLET ORAL DAILY
Qty: 30 TABLET | Refills: 5 | Status: SHIPPED | OUTPATIENT
Start: 2023-10-16

## 2023-10-16 RX ORDER — FUROSEMIDE 40 MG/1
40 TABLET ORAL DAILY
Qty: 30 TABLET | Refills: 5 | Status: SHIPPED | OUTPATIENT
Start: 2023-10-16

## 2023-10-23 RX ORDER — ATORVASTATIN CALCIUM 20 MG/1
TABLET, FILM COATED ORAL
Qty: 90 TABLET | Refills: 3 | Status: SHIPPED | OUTPATIENT
Start: 2023-10-23

## 2023-10-23 RX ORDER — SPIRONOLACTONE 50 MG/1
TABLET, FILM COATED ORAL
Qty: 90 TABLET | Refills: 3 | Status: SHIPPED | OUTPATIENT
Start: 2023-10-23

## 2023-12-07 NOTE — PATIENT INSTRUCTIONS
-please walk over to our lab for your blood testing, located right outside our office.   -please ensure that you have made arrangements to get your Covid test done at our designated. Upon arrival, call [phone] and they will come out to your car to do the test.   -stop taking asprin and motrin until after your surgery. Continue taking your beta-blocker you are taking one.   -no eating / drinking the after midnight the night before surgery. Problem: PAIN - ADULT  Goal: Verbalizes/displays adequate comfort level or baseline comfort level  Description: Interventions:  - Encourage patient to monitor pain and request assistance  - Assess pain using appropriate pain scale  - Administer analgesics based on type and severity of pain and evaluate response  - Implement non-pharmacological measures as appropriate and evaluate response  - Consider cultural and social influences on pain and pain management  - Notify physician/advanced practitioner if interventions unsuccessful or patient reports new pain  Outcome: Adequate for Discharge     Problem: INFECTION - ADULT  Goal: Absence or prevention of progression during hospitalization  Description: INTERVENTIONS:  - Assess and monitor for signs and symptoms of infection  - Monitor lab/diagnostic results  - Monitor all insertion sites, i.e. indwelling lines, tubes, and drains  - Monitor endotracheal if appropriate and nasal secretions for changes in amount and color  - [place] appropriate cooling/warming therapies per order  - Administer medications as ordered  - Instruct and encourage patient and family to use good hand hygiene technique  - Identify and instruct in appropriate isolation precautions for identified infection/condition  Outcome: Adequate for Discharge  Goal: Absence of fever/infection during neutropenic period  Description: INTERVENTIONS:  - Monitor WBC    Outcome: Adequate for Discharge     Problem: SAFETY ADULT  Goal: Patient will remain free of falls  Description: INTERVENTIONS:  - Educate patient/family on patient safety including physical limitations  - Instruct patient to call for assistance with activity   - Consult OT/PT to assist with strengthening/mobility   - Keep Call bell within reach  - Keep bed low and locked with side rails adjusted as appropriate  - Keep care items and personal belongings within reach  - Initiate and maintain comfort rounds  - Make Fall Risk Sign visible to staff  - Offer Toileting every  Hours, in advance of need  - Initiate/Maintain alarm  - Obtain necessary fall risk management equipment:  - Apply yellow socks and bracelet for high fall risk patients  - Consider moving patient to room near nurses station  Outcome: Adequate for Discharge  Goal: Maintain or return to baseline ADL function  Description: INTERVENTIONS:  -  Assess patient's ability to carry out ADLs; assess patient's baseline for ADL function and identify physical deficits which impact ability to perform ADLs (bathing, care of mouth/teeth, toileting, grooming, dressing, etc.)  - Assess/evaluate cause of self-care deficits   - Assess range of motion  - Assess patient's mobility; develop plan if impaired  - Assess patient's need for assistive devices and provide as appropriate  - Encourage maximum independence but intervene and supervise when necessary  - Involve family in performance of ADLs  - Assess for home care needs following discharge   - Consider OT consult to assist with ADL evaluation and planning for discharge  - Provide patient education as appropriate  Outcome: Adequate for Discharge  Goal: Maintains/Returns to pre admission functional level  Description: INTERVENTIONS:  - Perform AM-PAC 6 Click Basic Mobility/ Daily Activity assessment daily.  - Set and communicate daily mobility goal to care team and patient/family/caregiver. - Collaborate with rehabilitation services on mobility goals if consulted  - Perform Range of Motion  times a day. - Reposition patient every hours.   - Dangle patient  times a day  - Stand patient  times a day  - Ambulate patient  times a day  - Out of bed to chair  times a day   - Out of bed for meals  times a day  - Out of bed for toileting  - Record patient progress and toleration of activity level   Outcome: Adequate for Discharge     Problem: DISCHARGE PLANNING  Goal: Discharge to home or other facility with appropriate resources  Description: INTERVENTIONS:  - Identify barriers to discharge w/patient and caregiver  - Arrange for needed discharge resources and transportation as appropriate  - Identify discharge learning needs (meds, wound care, etc.)  - Arrange for interpretive services to assist at discharge as needed  - Refer to Case Management Department for coordinating discharge planning if the patient needs post-hospital services based on physician/advanced practitioner order or complex needs related to functional status, cognitive ability, or social support system  Outcome: Adequate for Discharge     Problem: Knowledge Deficit  Goal: Patient/family/caregiver demonstrates understanding of disease process, treatment plan, medications, and discharge instructions  Description: Complete learning assessment and assess knowledge base. Interventions:  - Provide teaching at level of understanding  - Provide teaching via preferred learning methods  Outcome: Adequate for Discharge     Problem: Nutrition/Hydration-ADULT  Goal: Nutrient/Hydration intake appropriate for improving, restoring or maintaining nutritional needs  Description: Monitor and assess patient's nutrition/hydration status for malnutrition. Collaborate with interdisciplinary team and initiate plan and interventions as ordered. Monitor patient's weight and dietary intake as ordered or per policy. Utilize nutrition screening tool and intervene as necessary. Determine patient's food preferences and provide high-protein, high-caloric foods as appropriate.      INTERVENTIONS:  - Monitor oral intake, urinary output, labs, and treatment plans  - Assess nutrition and hydration status and recommend course of action  - Evaluate amount of meals eaten  - Assist patient with eating if necessary   - Allow adequate time for meals  - Recommend/ encourage appropriate diets, oral nutritional supplements, and vitamin/mineral supplements  - Order, calculate, and assess calorie counts as needed  - Recommend, monitor, and adjust tube feedings and TPN/PPN based on assessed needs  - Assess need for intravenous fluids  - Provide specific nutrition/hydration education as appropriate  - Include patient/family/caregiver in decisions related to nutrition  Outcome: Adequate for Discharge

## 2023-12-26 RX ORDER — POTASSIUM CHLORIDE 750 MG/1
TABLET, FILM COATED, EXTENDED RELEASE ORAL
Qty: 90 TABLET | Refills: 1 | Status: SHIPPED | OUTPATIENT
Start: 2023-12-26

## 2023-12-28 RX ORDER — POTASSIUM CHLORIDE 750 MG/1
TABLET, FILM COATED, EXTENDED RELEASE ORAL
Qty: 90 TABLET | Refills: 3 | OUTPATIENT
Start: 2023-12-28

## 2024-04-15 RX ORDER — FUROSEMIDE 40 MG/1
40 TABLET ORAL DAILY
Qty: 30 TABLET | Refills: 5 | OUTPATIENT
Start: 2024-04-15

## 2024-04-15 RX ORDER — FUROSEMIDE 40 MG/1
40 TABLET ORAL DAILY
Qty: 30 TABLET | Refills: 5 | Status: SHIPPED | OUTPATIENT
Start: 2024-04-15

## 2024-05-17 RX ORDER — LEVOTHYROXINE SODIUM 0.05 MG/1
50 TABLET ORAL DAILY
Qty: 30 TABLET | Refills: 5 | Status: SHIPPED | OUTPATIENT
Start: 2024-05-17

## 2024-06-06 DIAGNOSIS — E10.21 DIABETIC NEPHROPATHY ASSOCIATED WITH TYPE 1 DIABETES MELLITUS (HCC): Primary | ICD-10-CM

## 2024-06-07 DIAGNOSIS — E03.9 HYPOTHYROIDISM, UNSPECIFIED TYPE: ICD-10-CM

## 2024-06-07 RX ORDER — INSULIN LISPRO 100 [IU]/ML
INJECTION, SOLUTION INTRAVENOUS; SUBCUTANEOUS
Qty: 10 ADJUSTABLE DOSE PRE-FILLED PEN SYRINGE | Refills: 4 | Status: SHIPPED | OUTPATIENT
Start: 2024-06-07

## 2024-06-07 RX ORDER — INSULIN LISPRO 100 [IU]/ML
INJECTION, SOLUTION INTRAVENOUS; SUBCUTANEOUS
Qty: 10 ADJUSTABLE DOSE PRE-FILLED PEN SYRINGE | Refills: 4 | OUTPATIENT
Start: 2024-06-07

## 2024-06-07 NOTE — TELEPHONE ENCOUNTER
From: Margarita Gaxiola  To: Office of Dr. Alex Reddy  Sent: 6/6/2024 6:10 PM EDT  Subject: Medication Renewal Request    Refills have been requested for the following medications:     insulin lispro (HUMALOG KWIKPEN) 100 UNIT/ML pen [Dr. Alex Reddy MD]    Preferred pharmacy: [facility] #[id number] - [facility] [address] [facility] [phone] -  [phone]

## 2024-06-21 RX ORDER — POTASSIUM CHLORIDE 750 MG/1
TABLET, FILM COATED, EXTENDED RELEASE ORAL
Qty: 90 TABLET | Refills: 1 | Status: SHIPPED | OUTPATIENT
Start: 2024-06-21

## 2024-07-01 ENCOUNTER — OFFICE VISIT (OUTPATIENT)
Dept: ENDOCRINOLOGY | Age: 34
End: 2024-07-01
Payer: COMMERCIAL

## 2024-07-01 VITALS
OXYGEN SATURATION: 96 % | WEIGHT: 220 LBS | SYSTOLIC BLOOD PRESSURE: 114 MMHG | HEART RATE: 72 BPM | BODY MASS INDEX: 34.53 KG/M2 | HEIGHT: 67 IN | DIASTOLIC BLOOD PRESSURE: 75 MMHG

## 2024-07-01 DIAGNOSIS — E10.21 DIABETIC NEPHROPATHY ASSOCIATED WITH TYPE 1 DIABETES MELLITUS (HCC): Primary | ICD-10-CM

## 2024-07-01 DIAGNOSIS — E03.9 HYPOTHYROIDISM, UNSPECIFIED TYPE: ICD-10-CM

## 2024-07-01 LAB
CHP ED QC CHECK: NORMAL
GLUCOSE BLD-MCNC: 307 MG/DL
HBA1C MFR BLD: 7.3 %

## 2024-07-01 PROCEDURE — 83036 HEMOGLOBIN GLYCOSYLATED A1C: CPT | Performed by: INTERNAL MEDICINE

## 2024-07-01 PROCEDURE — 3051F HG A1C>EQUAL 7.0%<8.0%: CPT | Performed by: INTERNAL MEDICINE

## 2024-07-01 PROCEDURE — 99213 OFFICE O/P EST LOW 20 MIN: CPT | Performed by: INTERNAL MEDICINE

## 2024-07-01 PROCEDURE — 82962 GLUCOSE BLOOD TEST: CPT | Performed by: INTERNAL MEDICINE

## 2024-07-01 RX ORDER — PEN NEEDLE, DIABETIC 32 GX 1/6"
NEEDLE, DISPOSABLE MISCELLANEOUS
Qty: 200 EACH | Refills: 3 | Status: SHIPPED | OUTPATIENT
Start: 2024-07-01

## 2024-07-01 RX ORDER — INSULIN LISPRO 100 [IU]/ML
INJECTION, SOLUTION INTRAVENOUS; SUBCUTANEOUS
Qty: 5 ADJUSTABLE DOSE PRE-FILLED PEN SYRINGE | Refills: 3 | Status: SHIPPED | OUTPATIENT
Start: 2024-07-01

## 2024-07-01 RX ORDER — INSULIN GLARGINE 100 [IU]/ML
INJECTION, SOLUTION SUBCUTANEOUS
Qty: 5 ADJUSTABLE DOSE PRE-FILLED PEN SYRINGE | Refills: 3 | Status: SHIPPED | OUTPATIENT
Start: 2024-07-01

## 2024-07-01 ASSESSMENT — ENCOUNTER SYMPTOMS
EYES NEGATIVE: 1
VISUAL CHANGE: 0

## 2024-07-01 NOTE — PROGRESS NOTES
GFRAA >60.0 12/21/2020     Lab Results   Component Value Date    WBC 7.8 12/21/2020    HGB 13.1 12/21/2020    HCT 40.1 12/21/2020    MCV 84.1 12/21/2020     12/21/2020     Lab Results   Component Value Date    LABA1C 7.3 07/01/2024    LABA1C 7.8 09/14/2023    LABA1C 7.1 07/22/2022     Lab Results   Component Value Date    HDL 62 (H) 09/18/2020    HDL 46 01/20/2017    CHOL 106 09/18/2020    CHOL 96 01/20/2017    TRIG 80 09/18/2020    TRIG 217 (H) 01/20/2017     No results found for: \"TESTM\"  Lab Results   Component Value Date    TSH 2.040 09/14/2023    TSH 0.965 09/18/2020    TSH 3.080 06/20/2019    T4FREE 1.21 09/14/2023    T4FREE 1.30 09/18/2020    T4FREE 1.39 06/20/2019     No results found for: \"TPOABS\"    Review of Systems   Eyes: Negative.    Cardiovascular: Negative.    Endocrine: Negative for cold intolerance, heat intolerance, polydipsia and polyuria.   All other systems reviewed and are negative.      Objective:   Physical Exam  Vitals reviewed.   Constitutional:       General: She is not in acute distress.     Appearance: Normal appearance. She is obese.   HENT:      Head: Normocephalic and atraumatic.      Right Ear: External ear normal.      Left Ear: External ear normal.      Nose: Nose normal.   Eyes:      General: No scleral icterus.        Right eye: No discharge.         Left eye: No discharge.      Extraocular Movements: Extraocular movements intact.      Conjunctiva/sclera: Conjunctivae normal.   Cardiovascular:      Rate and Rhythm: Normal rate.   Pulmonary:      Effort: Pulmonary effort is normal.   Musculoskeletal:         General: Normal range of motion.      Cervical back: Normal range of motion and neck supple.      Right lower leg: No edema.      Left lower leg: No edema.   Skin:     Findings: No lesion or rash.   Neurological:      General: No focal deficit present.      Mental Status: She is alert and oriented to person, place, and time.   Psychiatric:         Mood and Affect: Mood

## 2024-07-05 DIAGNOSIS — E03.9 HYPOTHYROIDISM, UNSPECIFIED TYPE: ICD-10-CM

## 2024-07-05 DIAGNOSIS — E10.21 DIABETIC NEPHROPATHY ASSOCIATED WITH TYPE 1 DIABETES MELLITUS (HCC): ICD-10-CM

## 2024-07-05 LAB
ANION GAP SERPL CALCULATED.3IONS-SCNC: 10 MEQ/L (ref 9–15)
BUN SERPL-MCNC: 16 MG/DL (ref 6–20)
CALCIUM SERPL-MCNC: 8.6 MG/DL (ref 8.5–9.9)
CHLORIDE SERPL-SCNC: 105 MEQ/L (ref 95–107)
CHOLEST SERPL-MCNC: 111 MG/DL (ref 0–199)
CO2 SERPL-SCNC: 26 MEQ/L (ref 20–31)
CREAT SERPL-MCNC: 0.68 MG/DL (ref 0.5–0.9)
GLUCOSE SERPL-MCNC: 105 MG/DL (ref 70–99)
HDLC SERPL-MCNC: 63 MG/DL (ref 40–59)
LDLC SERPL CALC-MCNC: 38 MG/DL (ref 0–129)
POTASSIUM SERPL-SCNC: 3.8 MEQ/L (ref 3.4–4.9)
SODIUM SERPL-SCNC: 141 MEQ/L (ref 135–144)
T4 FREE SERPL-MCNC: 1.12 NG/DL (ref 0.84–1.68)
TRIGL SERPL-MCNC: 51 MG/DL (ref 0–150)
TSH REFLEX: 1.64 UIU/ML (ref 0.44–3.86)

## 2024-09-30 RX ORDER — FUROSEMIDE 40 MG
40 TABLET ORAL DAILY
Qty: 30 TABLET | Refills: 5 | OUTPATIENT
Start: 2024-09-30

## 2024-10-04 DIAGNOSIS — E10.21 DIABETIC NEPHROPATHY ASSOCIATED WITH TYPE 1 DIABETES MELLITUS (HCC): Primary | ICD-10-CM

## 2024-10-04 RX ORDER — FUROSEMIDE 40 MG
40 TABLET ORAL DAILY
Qty: 30 TABLET | Refills: 5 | OUTPATIENT
Start: 2024-10-04

## 2024-10-04 RX ORDER — FUROSEMIDE 40 MG
40 TABLET ORAL DAILY
Qty: 30 TABLET | Refills: 5 | Status: SHIPPED | OUTPATIENT
Start: 2024-10-04

## 2024-10-07 RX ORDER — SPIRONOLACTONE 50 MG/1
TABLET, FILM COATED ORAL
Qty: 90 TABLET | Refills: 3 | Status: SHIPPED | OUTPATIENT
Start: 2024-10-07

## 2024-10-07 RX ORDER — ATORVASTATIN CALCIUM 20 MG/1
TABLET, FILM COATED ORAL
Qty: 90 TABLET | Refills: 3 | Status: SHIPPED | OUTPATIENT
Start: 2024-10-07

## 2024-12-11 RX ORDER — POTASSIUM CHLORIDE 750 MG/1
TABLET, EXTENDED RELEASE ORAL
Qty: 90 TABLET | Refills: 1 | Status: SHIPPED | OUTPATIENT
Start: 2024-12-11

## 2024-12-11 NOTE — TELEPHONE ENCOUNTER
Requested Prescriptions     Pending Prescriptions Disp Refills    potassium chloride (KLOR-CON) 10 MEQ extended release tablet 90 tablet 1     Sig: TAKE 1 TABLET BY MOUTH EVERY DAY     PLEASE REFILL

## 2024-12-28 DIAGNOSIS — E10.21 DIABETIC NEPHROPATHY ASSOCIATED WITH TYPE 1 DIABETES MELLITUS (HCC): ICD-10-CM

## 2024-12-30 SDOH — HEALTH STABILITY: PHYSICAL HEALTH: ON AVERAGE, HOW MANY DAYS PER WEEK DO YOU ENGAGE IN MODERATE TO STRENUOUS EXERCISE (LIKE A BRISK WALK)?: 5 DAYS

## 2024-12-30 SDOH — HEALTH STABILITY: PHYSICAL HEALTH: ON AVERAGE, HOW MANY MINUTES DO YOU ENGAGE IN EXERCISE AT THIS LEVEL?: 150+ MIN

## 2025-01-02 ENCOUNTER — OFFICE VISIT (OUTPATIENT)
Dept: PRIMARY CARE CLINIC | Age: 35
End: 2025-01-02
Payer: COMMERCIAL

## 2025-01-02 ENCOUNTER — OFFICE VISIT (OUTPATIENT)
Dept: ENDOCRINOLOGY | Age: 35
End: 2025-01-02
Payer: COMMERCIAL

## 2025-01-02 VITALS
HEIGHT: 67 IN | WEIGHT: 228 LBS | HEART RATE: 83 BPM | BODY MASS INDEX: 35.79 KG/M2 | SYSTOLIC BLOOD PRESSURE: 109 MMHG | DIASTOLIC BLOOD PRESSURE: 76 MMHG

## 2025-01-02 VITALS
WEIGHT: 230 LBS | BODY MASS INDEX: 36.02 KG/M2 | RESPIRATION RATE: 18 BRPM | SYSTOLIC BLOOD PRESSURE: 126 MMHG | TEMPERATURE: 98.5 F | OXYGEN SATURATION: 96 % | DIASTOLIC BLOOD PRESSURE: 80 MMHG | HEART RATE: 85 BPM

## 2025-01-02 DIAGNOSIS — M72.2 PLANTAR FASCIITIS OF LEFT FOOT: Primary | ICD-10-CM

## 2025-01-02 DIAGNOSIS — M25.561 CHRONIC PAIN OF RIGHT KNEE: ICD-10-CM

## 2025-01-02 DIAGNOSIS — G89.29 CHRONIC PAIN OF RIGHT KNEE: ICD-10-CM

## 2025-01-02 DIAGNOSIS — E03.9 HYPOTHYROIDISM, UNSPECIFIED TYPE: ICD-10-CM

## 2025-01-02 DIAGNOSIS — M70.62 TROCHANTERIC BURSITIS OF LEFT HIP: ICD-10-CM

## 2025-01-02 DIAGNOSIS — E10.21 DIABETIC NEPHROPATHY ASSOCIATED WITH TYPE 1 DIABETES MELLITUS (HCC): Primary | ICD-10-CM

## 2025-01-02 DIAGNOSIS — E10.21 DIABETIC NEPHROPATHY ASSOCIATED WITH TYPE 1 DIABETES MELLITUS (HCC): ICD-10-CM

## 2025-01-02 LAB
ANION GAP SERPL CALCULATED.3IONS-SCNC: 11 MEQ/L (ref 9–15)
BUN SERPL-MCNC: 21 MG/DL (ref 6–20)
CALCIUM SERPL-MCNC: 9.6 MG/DL (ref 8.5–9.9)
CHLORIDE SERPL-SCNC: 104 MEQ/L (ref 95–107)
CHP ED QC CHECK: NORMAL
CO2 SERPL-SCNC: 25 MEQ/L (ref 20–31)
CREAT SERPL-MCNC: 0.83 MG/DL (ref 0.5–0.9)
CREAT UR-MCNC: 937.3 MG/DL
GLUCOSE BLD-MCNC: 148 MG/DL
GLUCOSE SERPL-MCNC: 154 MG/DL (ref 70–99)
HBA1C MFR BLD: 7.8 %
MICROALBUMIN UR-MCNC: 11.6 MG/DL
MICROALBUMIN/CREAT UR-RTO: 12.4 MG/G (ref 0–30)
POTASSIUM SERPL-SCNC: 4.1 MEQ/L (ref 3.4–4.9)
SODIUM SERPL-SCNC: 140 MEQ/L (ref 135–144)
T4 FREE SERPL-MCNC: 1.19 NG/DL (ref 0.84–1.68)
TSH REFLEX: 1.94 UIU/ML (ref 0.44–3.86)

## 2025-01-02 PROCEDURE — 99213 OFFICE O/P EST LOW 20 MIN: CPT | Performed by: INTERNAL MEDICINE

## 2025-01-02 PROCEDURE — 82962 GLUCOSE BLOOD TEST: CPT | Performed by: INTERNAL MEDICINE

## 2025-01-02 PROCEDURE — 83036 HEMOGLOBIN GLYCOSYLATED A1C: CPT | Performed by: INTERNAL MEDICINE

## 2025-01-02 PROCEDURE — 96372 THER/PROPH/DIAG INJ SC/IM: CPT | Performed by: INTERNAL MEDICINE

## 2025-01-02 PROCEDURE — 3051F HG A1C>EQUAL 7.0%<8.0%: CPT | Performed by: INTERNAL MEDICINE

## 2025-01-02 PROCEDURE — 99204 OFFICE O/P NEW MOD 45 MIN: CPT | Performed by: INTERNAL MEDICINE

## 2025-01-02 RX ORDER — KETOROLAC TROMETHAMINE 10 MG/1
10 TABLET, FILM COATED ORAL EVERY 6 HOURS PRN
Qty: 20 TABLET | Refills: 0 | Status: SHIPPED | OUTPATIENT
Start: 2025-01-02 | End: 2026-01-02

## 2025-01-02 RX ORDER — KETOROLAC TROMETHAMINE 30 MG/ML
60 INJECTION, SOLUTION INTRAMUSCULAR; INTRAVENOUS ONCE
Status: COMPLETED | OUTPATIENT
Start: 2025-01-02 | End: 2025-01-02

## 2025-01-02 RX ADMIN — KETOROLAC TROMETHAMINE 60 MG: 30 INJECTION, SOLUTION INTRAMUSCULAR; INTRAVENOUS at 13:29

## 2025-01-02 SDOH — ECONOMIC STABILITY: FOOD INSECURITY: WITHIN THE PAST 12 MONTHS, YOU WORRIED THAT YOUR FOOD WOULD RUN OUT BEFORE YOU GOT MONEY TO BUY MORE.: NEVER TRUE

## 2025-01-02 SDOH — ECONOMIC STABILITY: FOOD INSECURITY: WITHIN THE PAST 12 MONTHS, THE FOOD YOU BOUGHT JUST DIDN'T LAST AND YOU DIDN'T HAVE MONEY TO GET MORE.: NEVER TRUE

## 2025-01-02 SDOH — ECONOMIC STABILITY: INCOME INSECURITY: HOW HARD IS IT FOR YOU TO PAY FOR THE VERY BASICS LIKE FOOD, HOUSING, MEDICAL CARE, AND HEATING?: NOT HARD AT ALL

## 2025-01-02 ASSESSMENT — PATIENT HEALTH QUESTIONNAIRE - PHQ9
SUM OF ALL RESPONSES TO PHQ9 QUESTIONS 1 & 2: 0
SUM OF ALL RESPONSES TO PHQ QUESTIONS 1-9: 0
1. LITTLE INTEREST OR PLEASURE IN DOING THINGS: NOT AT ALL
SUM OF ALL RESPONSES TO PHQ QUESTIONS 1-9: 0
2. FEELING DOWN, DEPRESSED OR HOPELESS: NOT AT ALL

## 2025-01-02 NOTE — PROGRESS NOTES
Subjective:      Patient ID: Margarita Gaxiola is a 34 y.o. female    Foot pain x 8 months  Knee pain x 20 yrs  HPI  Pt presents with 8 months of intermittent sharp heel pain worst after prolonged inactivity.      Right knee pain x 20 yrs. Pain is intermittent, sharp rated 6/10, nonradiating. Assoc locking and buckling. Triggered by a certain body turn in high school. Never sought medical evaluation.      Left hip pain x 2 months. Sharp intermittent upon laying on the hip. No trauma, or swelling.      Past Medical History:   Diagnosis Date    Diabetes mellitus type 1 (HCC)     Headache(784.0)     Hypothyroidism      Past Surgical History:   Procedure Laterality Date    CARPAL TUNNEL RELEASE Right 12/29/2020    RIGHT HAND CARPAL TUNNEL DECOMPRESSION performed by Waylon Truong MD at [facility] [place]     Social History     Socioeconomic History    Marital status:      Spouse name: Not on file    Number of children: Not on file    Years of education: Not on file    Highest education level: Not on file   Occupational History    Not on file   Tobacco Use    Smoking status: Former     Passive exposure: Past    Smokeless tobacco: Never    Tobacco comments:     uses electronic cigarette   Vaping Use    Vaping status: Never Used   Substance and Sexual Activity    Alcohol use: Not on file    Drug use: Not on file    Sexual activity: Not on file   Other Topics Concern    Not on file   Social History Narrative    Not on file     Social Determinants of Health     Financial Resource Strain: Low Risk  (1/2/2025)    Overall Financial Resource Strain (CARDIA)     Difficulty of Paying Living Expenses: Not hard at all   Food Insecurity: No Food Insecurity (1/2/2025)    Hunger Vital Sign     Worried About Running Out of Food in the Last Year: Never true     Ran Out of Food in the Last Year: Never true   Transportation Needs: Unknown (1/2/2025)    PRAPARE - Transportation     Lack of Transportation (Medical): Not on file     Lack

## 2025-01-02 NOTE — PROGRESS NOTES
1/2/2025    Assessment:       Diagnosis Orders   1. Diabetic nephropathy associated with type 1 diabetes mellitus (HCC)  POCT Glucose    POCT glycosylated hemoglobin (Hb A1C)      2. Hypothyroidism, unspecified type              PLAN:     Orders Placed This Encounter   Procedures    Basic Metabolic Panel     Standing Status:   Future     Number of Occurrences:   1     Standing Expiration Date:   1/2/2026    T4, Free     Standing Status:   Future     Number of Occurrences:   1     Standing Expiration Date:   1/2/2026    TSH reflex to FT4     Standing Status:   Future     Number of Occurrences:   1     Standing Expiration Date:   1/2/2026    Albumin/Creatinine Ratio, Urine     Standing Status:   Future     Number of Occurrences:   1     Standing Expiration Date:   1/2/2026    POCT Glucose    POCT glycosylated hemoglobin (Hb A1C)     Continue current dose of Lantus 10 units in the morning Humalog 10 units with each meals continue Synthroid 50 mcg daily patient to follow-up in 3 to 6 months time      Orders Placed This Encounter   Procedures    POCT Glucose    POCT glycosylated hemoglobin (Hb A1C)     No orders of the defined types were placed in this encounter.    No follow-ups on file.  Subjective:     Chief Complaint   Patient presents with    Diabetes    Hypothyroidism     Vitals:    01/02/25 1608   BP: 109/76   Pulse: 83   Weight: 103.4 kg (228 lb)   Height: 1.702 m (5' 7.01\")     Wt Readings from Last 3 Encounters:   01/02/25 103.4 kg (228 lb)   01/02/25 104.3 kg (230 lb)   07/01/24 99.8 kg (220 lb)     BP Readings from Last 3 Encounters:   01/02/25 109/76   01/02/25 126/80   07/01/24 114/75       Follow-up on type 1 diabetes on insulin injections Lantus and Humalog hemoglobin A1c was done today 7.8 has increased slightly  Hypothyroidism on levothyroxine 50 mcg a day thyroid function test were done today stable with normal  Hemoglobin A1C       Date                     Value               Ref Range

## 2025-01-03 ASSESSMENT — ENCOUNTER SYMPTOMS
WHEEZING: 0
COUGH: 0
SHORTNESS OF BREATH: 0

## 2025-01-13 ENCOUNTER — HOSPITAL ENCOUNTER (OUTPATIENT)
Dept: MRI IMAGING | Age: 35
Discharge: HOME | End: 2025-01-15
Attending: INTERNAL MEDICINE
Payer: COMMERCIAL

## 2025-01-13 DIAGNOSIS — M25.561 CHRONIC PAIN OF RIGHT KNEE: ICD-10-CM

## 2025-01-13 DIAGNOSIS — G89.29 CHRONIC PAIN OF RIGHT KNEE: ICD-10-CM

## 2025-01-13 PROCEDURE — 73721 MRI JNT OF LWR EXTRE W/O DYE: CPT

## 2025-01-27 DIAGNOSIS — E10.21 DIABETIC NEPHROPATHY ASSOCIATED WITH TYPE 1 DIABETES MELLITUS (HCC): ICD-10-CM

## 2025-03-13 RX ORDER — LEVOTHYROXINE SODIUM 50 UG/1
50 TABLET ORAL DAILY
Qty: 30 TABLET | Refills: 5 | Status: SHIPPED | OUTPATIENT
Start: 2025-03-13

## 2025-03-25 DIAGNOSIS — E10.21 DIABETIC NEPHROPATHY ASSOCIATED WITH TYPE 1 DIABETES MELLITUS: ICD-10-CM

## 2025-03-25 RX ORDER — FUROSEMIDE 40 MG/1
40 TABLET ORAL DAILY
Qty: 30 TABLET | Refills: 5 | Status: SHIPPED | OUTPATIENT
Start: 2025-03-25

## 2025-03-25 RX ORDER — FUROSEMIDE 40 MG/1
40 TABLET ORAL DAILY
Qty: 30 TABLET | Refills: 5 | OUTPATIENT
Start: 2025-03-25

## 2025-06-11 RX ORDER — POTASSIUM CHLORIDE 750 MG/1
TABLET, EXTENDED RELEASE ORAL
Qty: 90 TABLET | Refills: 1 | Status: SHIPPED | OUTPATIENT
Start: 2025-06-11

## 2025-06-11 RX ORDER — POTASSIUM CHLORIDE 750 MG/1
10 TABLET, EXTENDED RELEASE ORAL DAILY
Qty: 90 TABLET | Refills: 1 | Status: SHIPPED | OUTPATIENT
Start: 2025-06-11

## 2025-07-03 ENCOUNTER — OFFICE VISIT (OUTPATIENT)
Age: 35
End: 2025-07-03
Payer: COMMERCIAL

## 2025-07-03 VITALS
HEART RATE: 88 BPM | HEIGHT: 67 IN | WEIGHT: 229 LBS | DIASTOLIC BLOOD PRESSURE: 65 MMHG | SYSTOLIC BLOOD PRESSURE: 95 MMHG | BODY MASS INDEX: 35.94 KG/M2

## 2025-07-03 DIAGNOSIS — E03.9 HYPOTHYROIDISM, UNSPECIFIED TYPE: ICD-10-CM

## 2025-07-03 DIAGNOSIS — E66.9 OBESITY (BMI 30-39.9): ICD-10-CM

## 2025-07-03 DIAGNOSIS — E10.21 DIABETIC NEPHROPATHY ASSOCIATED WITH TYPE 1 DIABETES MELLITUS (HCC): Primary | ICD-10-CM

## 2025-07-03 DIAGNOSIS — E10.21 DIABETIC NEPHROPATHY ASSOCIATED WITH TYPE 1 DIABETES MELLITUS (HCC): ICD-10-CM

## 2025-07-03 LAB
ANION GAP SERPL CALCULATED.3IONS-SCNC: 15 MEQ/L (ref 9–15)
BUN SERPL-MCNC: 17 MG/DL (ref 6–20)
CALCIUM SERPL-MCNC: 9.3 MG/DL (ref 8.5–9.9)
CHLORIDE SERPL-SCNC: 104 MEQ/L (ref 95–107)
CHP ED QC CHECK: NORMAL
CO2 SERPL-SCNC: 24 MEQ/L (ref 20–31)
CREAT SERPL-MCNC: 0.69 MG/DL (ref 0.5–0.9)
GLUCOSE BLD-MCNC: 111 MG/DL
GLUCOSE SERPL-MCNC: 117 MG/DL (ref 70–99)
POTASSIUM SERPL-SCNC: 3.5 MEQ/L (ref 3.4–4.9)
SODIUM SERPL-SCNC: 143 MEQ/L (ref 135–144)
T4 FREE SERPL-MCNC: 1.32 NG/DL (ref 0.84–1.68)
TSH REFLEX: 1.78 UIU/ML (ref 0.44–3.86)

## 2025-07-03 PROCEDURE — 83036 HEMOGLOBIN GLYCOSYLATED A1C: CPT | Performed by: INTERNAL MEDICINE

## 2025-07-03 PROCEDURE — 3051F HG A1C>EQUAL 7.0%<8.0%: CPT | Performed by: INTERNAL MEDICINE

## 2025-07-03 PROCEDURE — 99214 OFFICE O/P EST MOD 30 MIN: CPT | Performed by: INTERNAL MEDICINE

## 2025-07-03 PROCEDURE — 82962 GLUCOSE BLOOD TEST: CPT | Performed by: INTERNAL MEDICINE

## 2025-07-03 RX ORDER — PHENTERMINE HYDROCHLORIDE 37.5 MG/1
37.5 TABLET ORAL
Qty: 30 TABLET | Refills: 2 | Status: SHIPPED | OUTPATIENT
Start: 2025-07-03 | End: 2025-08-02

## 2025-07-03 NOTE — PROGRESS NOTES
7/3/2025    Assessment:       Diagnosis Orders   1. Diabetic nephropathy associated with type 1 diabetes mellitus (HCC)  POCT Glucose    POCT glycosylated hemoglobin (Hb A1C)    Basic Metabolic Panel    Hemoglobin A1C    phentermine (ADIPEX-P) 37.5 MG tablet      2. Hypothyroidism, unspecified type  T4, Free    TSH reflex to FT4    phentermine (ADIPEX-P) 37.5 MG tablet      3. Obesity (BMI 30-39.9)  phentermine (ADIPEX-P) 37.5 MG tablet            PLAN:     Orders Placed This Encounter   Medications    phentermine (ADIPEX-P) 37.5 MG tablet     Sig: Take 1 tablet by mouth every morning (before breakfast) for 30 days. Max Daily Amount: 37.5 mg     Dispense:  30 tablet     Refill:  2     BMI 35.86   Continue current dose of insulin continue current dose of Synthroid start on phentermine follow-up in 3 months labs were reviewed from today  More than 50% of 30 minutes spent in patient education counseling      Orders Placed This Encounter   Procedures    Basic Metabolic Panel     Standing Status:   Future     Expected Date:   7/3/2025     Expiration Date:   7/3/2026    Hemoglobin A1C     Standing Status:   Future     Expected Date:   7/3/2025     Expiration Date:   7/3/2026    T4, Free     Standing Status:   Future     Expected Date:   7/3/2025     Expiration Date:   7/3/2026    TSH reflex to FT4     Standing Status:   Future     Expected Date:   7/3/2025     Expiration Date:   7/3/2026    POCT Glucose    POCT glycosylated hemoglobin (Hb A1C)     No orders of the defined types were placed in this encounter.    No follow-ups on file.  Subjective:     Chief Complaint   Patient presents with    Diabetes    Hypothyroidism     Vitals:    07/03/25 1618   BP: 95/65   Pulse: 88   Weight: 103.9 kg (229 lb)   Height: 1.702 m (5' 7.01\")     Wt Readings from Last 3 Encounters:   07/03/25 103.9 kg (229 lb)   01/02/25 103.4 kg (228 lb)   01/02/25 104.3 kg (230 lb)     BP Readings from Last 3 Encounters:   07/03/25 95/65   01/02/25

## 2025-07-04 LAB
ESTIMATED AVERAGE GLUCOSE: 174 MG/DL
HBA1C MFR BLD: 7.7 % (ref 4–6)

## (undated) DEVICE — GLOVE ORANGE PI 8   MSG9080

## (undated) DEVICE — MARKER SURG SKIN GENTIAN VLT REG TIP W/ 6IN RUL

## (undated) DEVICE — HAND II: Brand: MEDLINE INDUSTRIES, INC.

## (undated) DEVICE — DRESSING GZ W1XL8IN COT XRFRM N ADH OVERWRAP CURAD

## (undated) DEVICE — SYRINGE IRRIG 60ML SFT PLIABLE BLB EZ TO GRP 1 HND USE W/

## (undated) DEVICE — GAUZE,SPONGE,FLUFF,6"X6.75",STRL,10/TRAY: Brand: MEDLINE

## (undated) DEVICE — PADDING CAST W2INXL4YD COT LO LINTING WYTEX

## (undated) DEVICE — COVER LT HNDL BLU PLAS

## (undated) DEVICE — BANDAGE COMPR W3INXL5YD HI E BGE W/ CLP SURE-WRAP

## (undated) DEVICE — SUTURE ETHLN SZ 4-0 L18IN NONABSORBABLE BLK L19MM PS-2 3/8 1667H

## (undated) DEVICE — GOWN,SIRUS,POLYRNF,BRTHSLV,XLN/XL,20/CS: Brand: MEDLINE

## (undated) DEVICE — PADDING UNDERCAST W4INXL12FT RAYON POLY SYN NONADHESIVE

## (undated) DEVICE — APPLICATOR MEDICATED 26 CC SOLUTION HI LT ORNG CHLORAPREP